# Patient Record
Sex: MALE | Race: BLACK OR AFRICAN AMERICAN | NOT HISPANIC OR LATINO | Employment: OTHER | URBAN - METROPOLITAN AREA
[De-identification: names, ages, dates, MRNs, and addresses within clinical notes are randomized per-mention and may not be internally consistent; named-entity substitution may affect disease eponyms.]

---

## 2023-03-29 DIAGNOSIS — E11.9 TYPE 2 DIABETES MELLITUS WITHOUT COMPLICATION, UNSPECIFIED WHETHER LONG TERM INSULIN USE (MULTI): Primary | ICD-10-CM

## 2023-03-29 DIAGNOSIS — I10 BENIGN ESSENTIAL HYPERTENSION: ICD-10-CM

## 2023-03-29 RX ORDER — AMLODIPINE BESYLATE 10 MG/1
10 TABLET ORAL DAILY
COMMUNITY
End: 2023-03-29 | Stop reason: SDUPTHER

## 2023-03-29 RX ORDER — METFORMIN HYDROCHLORIDE 500 MG/1
500 TABLET, EXTENDED RELEASE ORAL DAILY
COMMUNITY
Start: 2022-12-22 | End: 2023-03-29 | Stop reason: SDUPTHER

## 2023-03-30 RX ORDER — AMLODIPINE BESYLATE 10 MG/1
10 TABLET ORAL DAILY
Qty: 90 TABLET | Refills: 0 | Status: SHIPPED | OUTPATIENT
Start: 2023-03-30 | End: 2023-07-31 | Stop reason: SDUPTHER

## 2023-03-30 RX ORDER — METFORMIN HYDROCHLORIDE 500 MG/1
500 TABLET, EXTENDED RELEASE ORAL DAILY
Qty: 90 TABLET | Refills: 0 | Status: SHIPPED | OUTPATIENT
Start: 2023-03-30

## 2023-05-08 ENCOUNTER — OFFICE VISIT (OUTPATIENT)
Dept: PRIMARY CARE | Facility: CLINIC | Age: 73
End: 2023-05-08
Payer: MEDICARE

## 2023-05-08 ENCOUNTER — LAB (OUTPATIENT)
Dept: LAB | Facility: LAB | Age: 73
End: 2023-05-08
Payer: MEDICARE

## 2023-05-08 VITALS — SYSTOLIC BLOOD PRESSURE: 123 MMHG | DIASTOLIC BLOOD PRESSURE: 73 MMHG | WEIGHT: 218 LBS

## 2023-05-08 DIAGNOSIS — E11.9 TYPE 2 DIABETES MELLITUS WITHOUT COMPLICATION, UNSPECIFIED WHETHER LONG TERM INSULIN USE (MULTI): Primary | ICD-10-CM

## 2023-05-08 DIAGNOSIS — I10 PRIMARY HYPERTENSION: ICD-10-CM

## 2023-05-08 DIAGNOSIS — E11.9 TYPE 2 DIABETES MELLITUS WITHOUT COMPLICATION, UNSPECIFIED WHETHER LONG TERM INSULIN USE (MULTI): ICD-10-CM

## 2023-05-08 LAB
ESTIMATED AVERAGE GLUCOSE FOR HBA1C: 186 MG/DL
HEMOGLOBIN A1C/HEMOGLOBIN TOTAL IN BLOOD: 8.1 %

## 2023-05-08 PROCEDURE — 3078F DIAST BP <80 MM HG: CPT | Performed by: INTERNAL MEDICINE

## 2023-05-08 PROCEDURE — 3052F HG A1C>EQUAL 8.0%<EQUAL 9.0%: CPT | Performed by: INTERNAL MEDICINE

## 2023-05-08 PROCEDURE — 3074F SYST BP LT 130 MM HG: CPT | Performed by: INTERNAL MEDICINE

## 2023-05-08 PROCEDURE — 36415 COLL VENOUS BLD VENIPUNCTURE: CPT

## 2023-05-08 PROCEDURE — 83036 HEMOGLOBIN GLYCOSYLATED A1C: CPT

## 2023-05-08 PROCEDURE — 99213 OFFICE O/P EST LOW 20 MIN: CPT | Performed by: INTERNAL MEDICINE

## 2023-05-08 RX ORDER — CHLORTHALIDONE 25 MG/1
25 TABLET ORAL
COMMUNITY
Start: 2015-05-29 | End: 2023-05-08 | Stop reason: SDUPTHER

## 2023-05-08 RX ORDER — CHLORTHALIDONE 25 MG/1
25 TABLET ORAL
Qty: 90 TABLET | Refills: 1 | Status: SHIPPED | OUTPATIENT
Start: 2023-05-08 | End: 2023-07-31 | Stop reason: SDUPTHER

## 2023-05-08 NOTE — PROGRESS NOTES
Subjective   Patient ID: David Ko is a 72 y.o. male who presents for No chief complaint on file..    HPI follow-up visit no chest pain no shortness of breath no polyuria polydipsia no low blood sugars Home blood sugars have been averaging around 120s to 130s he does lawn care for himself and neighbors to exercise during the summertime previous laboratory results reviewed    Review of Systems    Objective   There were no vitals taken for this visit.  Vital signs noted alert and oriented x3 NCAT no JVD or bruit chest clear to auscultation CV regular rate and rhythm S1-S2 extremities no clubbing cyanosis or edema normal distal pulses  Physical Exam    Assessment/Plan    impression diabetes mellitus type 2 hypertension  Plan refill chlorthalidone see EMR check A1c advised on long-term blood sugar test diet weight loss exercise good water consumption review prior laboratory results and follow-up in 3 months    Review prior laboratory results (check)  does use [facility] med for strips

## 2023-07-21 ENCOUNTER — TELEPHONE (OUTPATIENT)
Dept: PRIMARY CARE | Facility: CLINIC | Age: 73
End: 2023-07-21
Payer: MEDICARE

## 2023-07-23 RX ORDER — INSULIN LISPRO 100 [IU]/ML
INJECTION, SOLUTION INTRAVENOUS; SUBCUTANEOUS
COMMUNITY
End: 2023-07-24

## 2023-07-23 RX ORDER — INSULIN LISPRO 100 [IU]/ML
4 INJECTION, SOLUTION INTRAVENOUS; SUBCUTANEOUS
COMMUNITY
Start: 2021-08-07 | End: 2023-07-24 | Stop reason: SDUPTHER

## 2023-07-24 DIAGNOSIS — E11.9 TYPE 2 DIABETES MELLITUS WITHOUT COMPLICATION, WITH LONG-TERM CURRENT USE OF INSULIN (MULTI): Primary | ICD-10-CM

## 2023-07-24 DIAGNOSIS — Z79.4 TYPE 2 DIABETES MELLITUS WITHOUT COMPLICATION, WITH LONG-TERM CURRENT USE OF INSULIN (MULTI): Primary | ICD-10-CM

## 2023-07-24 RX ORDER — INSULIN LISPRO 100 [IU]/ML
4 INJECTION, SOLUTION INTRAVENOUS; SUBCUTANEOUS
Qty: 3 ML | Refills: 11 | Status: CANCELLED | OUTPATIENT
Start: 2023-07-24 | End: 2024-05-19

## 2023-07-24 RX ORDER — INSULIN LISPRO 100 [IU]/ML
4 INJECTION, SOLUTION INTRAVENOUS; SUBCUTANEOUS
Qty: 10 ML | Refills: 1 | Status: SHIPPED | OUTPATIENT
Start: 2023-07-24 | End: 2023-08-20 | Stop reason: SDUPTHER

## 2023-07-31 DIAGNOSIS — I10 BENIGN ESSENTIAL HYPERTENSION: ICD-10-CM

## 2023-07-31 DIAGNOSIS — I10 PRIMARY HYPERTENSION: ICD-10-CM

## 2023-07-31 RX ORDER — HYDROCHLOROTHIAZIDE 25 MG/1
25 TABLET ORAL DAILY
COMMUNITY
End: 2023-07-31 | Stop reason: SDUPTHER

## 2023-08-01 RX ORDER — AMLODIPINE BESYLATE 10 MG/1
10 TABLET ORAL DAILY
Qty: 90 TABLET | Refills: 0 | Status: SHIPPED | OUTPATIENT
Start: 2023-08-01 | End: 2024-01-29 | Stop reason: SDUPTHER

## 2023-08-01 RX ORDER — HYDROCHLOROTHIAZIDE 25 MG/1
25 TABLET ORAL DAILY
Qty: 90 TABLET | Refills: 3 | Status: SHIPPED | OUTPATIENT
Start: 2023-08-01

## 2023-08-01 RX ORDER — CHLORTHALIDONE 25 MG/1
25 TABLET ORAL
Qty: 90 TABLET | Refills: 1 | Status: SHIPPED | OUTPATIENT
Start: 2023-08-01 | End: 2024-04-23

## 2023-08-15 ENCOUNTER — LAB (OUTPATIENT)
Dept: LAB | Facility: LAB | Age: 73
End: 2023-08-15
Payer: MEDICARE

## 2023-08-15 ENCOUNTER — OFFICE VISIT (OUTPATIENT)
Dept: PRIMARY CARE | Facility: CLINIC | Age: 73
End: 2023-08-15
Payer: MEDICARE

## 2023-08-15 VITALS — DIASTOLIC BLOOD PRESSURE: 76 MMHG | SYSTOLIC BLOOD PRESSURE: 137 MMHG | WEIGHT: 215 LBS

## 2023-08-15 DIAGNOSIS — E11.9 TYPE 2 DIABETES MELLITUS WITHOUT COMPLICATION, WITH LONG-TERM CURRENT USE OF INSULIN (MULTI): ICD-10-CM

## 2023-08-15 DIAGNOSIS — Z79.4 TYPE 2 DIABETES MELLITUS WITHOUT COMPLICATION, WITH LONG-TERM CURRENT USE OF INSULIN (MULTI): ICD-10-CM

## 2023-08-15 DIAGNOSIS — E78.2 MIXED HYPERLIPIDEMIA: ICD-10-CM

## 2023-08-15 DIAGNOSIS — E11.9 TYPE 2 DIABETES MELLITUS WITHOUT COMPLICATION, UNSPECIFIED WHETHER LONG TERM INSULIN USE (MULTI): Primary | ICD-10-CM

## 2023-08-15 DIAGNOSIS — E11.9 TYPE 2 DIABETES MELLITUS WITHOUT COMPLICATION, UNSPECIFIED WHETHER LONG TERM INSULIN USE (MULTI): ICD-10-CM

## 2023-08-15 DIAGNOSIS — I10 PRIMARY HYPERTENSION: ICD-10-CM

## 2023-08-15 DIAGNOSIS — N28.9 RENAL IMPAIRMENT: ICD-10-CM

## 2023-08-15 LAB
ESTIMATED AVERAGE GLUCOSE FOR HBA1C: 171 MG/DL
HEMOGLOBIN A1C/HEMOGLOBIN TOTAL IN BLOOD: 7.6 %

## 2023-08-15 PROCEDURE — 3075F SYST BP GE 130 - 139MM HG: CPT | Performed by: INTERNAL MEDICINE

## 2023-08-15 PROCEDURE — 83036 HEMOGLOBIN GLYCOSYLATED A1C: CPT

## 2023-08-15 PROCEDURE — 36415 COLL VENOUS BLD VENIPUNCTURE: CPT

## 2023-08-15 PROCEDURE — 99214 OFFICE O/P EST MOD 30 MIN: CPT | Performed by: INTERNAL MEDICINE

## 2023-08-15 PROCEDURE — 3051F HG A1C>EQUAL 7.0%<8.0%: CPT | Performed by: INTERNAL MEDICINE

## 2023-08-15 PROCEDURE — 3078F DIAST BP <80 MM HG: CPT | Performed by: INTERNAL MEDICINE

## 2023-08-15 NOTE — PROGRESS NOTES
Subjective   Patient ID: David Ko is a 72 y.o. male who presents for No chief complaint on file..    HPI follow-up visit no chest pain no shortness of breath no polyuria polydipsia nocturia x1 no low blood sugars but sometimes uses one half of his Humalog insulin 4 units instead of 8 units after meals Home blood sugars have been averaging around 150s has seen the renal physician that blood work was also reviewed slight anemia creatinine around 2.2 Review of Systems    Objective   There were no vitals taken for this visit.  Vital signs noted alert and oriented x3 NCAT no JVD or bruit chest clear to auscultation and percussion CV regular rate and rhythm S1-S2 without murmur gallop or rub extremities no clubbing cyanosis or edema normal distal pulses there is venous stasis and he is wearing stocking hose  Physical Exam    Assessment/Plan    impression diabetes mellitus type 2 hypertension hyperlipidemia?  Venous stasis renal insufficiency  Plan refill short acting insulin now Humalog see EMR check A1c advised on long-term blood sugar test diet weight loss exercise good water consumption he drinks approximately 64 ounces per day continue with blood pressure medication advised on kidneys and follow-up with nephrology advised on prior lipids which were low less than 140 total cholesterol and medication continue with stocking hose elevate legs watch salt in diet goal would be to try to get off of the KwikPen and use just the Lantus insulin review prior laboratory results with him follow-up in 3 months  tt40 cc21    Review prior laboratory results (check)  Refill Humalog KwikPen (check) and One Touch ultra test strips to [facility]

## 2023-08-20 RX ORDER — INSULIN LISPRO 100 [IU]/ML
4-8 INJECTION, SOLUTION INTRAVENOUS; SUBCUTANEOUS
Qty: 10 ML | Refills: 1 | Status: SHIPPED | OUTPATIENT
Start: 2023-08-20 | End: 2023-12-02

## 2023-10-25 PROBLEM — H26.9 CATARACT OF BOTH EYES: Status: ACTIVE | Noted: 2021-08-07

## 2023-10-25 PROBLEM — N18.4 CKD (CHRONIC KIDNEY DISEASE) STAGE 4, GFR 15-29 ML/MIN (MULTI): Chronic | Status: ACTIVE | Noted: 2021-08-04

## 2023-10-25 PROBLEM — H40.52X2: Status: ACTIVE | Noted: 2023-10-25

## 2023-10-25 PROBLEM — E11.311 DIABETIC MACULAR EDEMA (MULTI): Status: ACTIVE | Noted: 2021-08-07

## 2023-10-25 PROBLEM — H11.30 SCH (SUBCONJUNCTIVAL HEMORRHAGE): Status: ACTIVE | Noted: 2023-10-25

## 2023-10-25 PROBLEM — E11.319 DIABETIC RETINOPATHY (MULTI): Status: ACTIVE | Noted: 2023-10-25

## 2023-10-25 PROBLEM — H40.1190 PRIMARY OPEN ANGLE GLAUCOMA (POAG): Status: ACTIVE | Noted: 2021-08-07

## 2023-10-25 PROBLEM — H40.89: Status: ACTIVE | Noted: 2021-08-07

## 2023-10-25 PROBLEM — E78.2 COMBINED HYPERLIPIDEMIA: Status: ACTIVE | Noted: 2023-10-25

## 2023-10-25 PROBLEM — H52.4 BILATERAL PRESBYOPIA: Status: ACTIVE | Noted: 2023-10-25

## 2023-10-25 PROBLEM — N18.9 CHRONIC RENAL INSUFFICIENCY: Status: ACTIVE | Noted: 2023-10-25

## 2023-10-25 PROBLEM — H52.203 ASTIGMATISM, BILATERAL: Status: ACTIVE | Noted: 2023-10-25

## 2023-10-25 PROBLEM — H52.00 HYPEROPIA: Status: ACTIVE | Noted: 2021-08-07

## 2023-10-25 RX ORDER — BLOOD SUGAR DIAGNOSTIC
STRIP MISCELLANEOUS
COMMUNITY
Start: 2022-03-29 | End: 2023-11-15 | Stop reason: SDUPTHER

## 2023-10-25 RX ORDER — LANCETS 33 GAUGE
EACH MISCELLANEOUS
COMMUNITY
Start: 2023-05-22

## 2023-10-25 RX ORDER — PEN NEEDLE, DIABETIC 31 GX5/16"
NEEDLE, DISPOSABLE MISCELLANEOUS
COMMUNITY
Start: 2023-06-29 | End: 2023-11-15 | Stop reason: SDUPTHER

## 2023-10-25 RX ORDER — ROSUVASTATIN CALCIUM 10 MG/1
10 TABLET, COATED ORAL NIGHTLY
COMMUNITY
Start: 2022-11-23

## 2023-10-25 RX ORDER — HYDRALAZINE HYDROCHLORIDE 25 MG/1
25 TABLET, FILM COATED ORAL DAILY
COMMUNITY
Start: 2018-02-19

## 2023-10-25 RX ORDER — BRIMONIDINE TARTRATE AND TIMOLOL MALEATE 2; 5 MG/ML; MG/ML
SOLUTION OPHTHALMIC
COMMUNITY
Start: 2015-04-22 | End: 2023-11-27 | Stop reason: SDUPTHER

## 2023-10-25 RX ORDER — INSULIN DETEMIR 100 [IU]/ML
INJECTION, SOLUTION SUBCUTANEOUS
COMMUNITY
Start: 2015-03-03

## 2023-10-25 RX ORDER — CLOTRIMAZOLE 1 %
CREAM (GRAM) TOPICAL
COMMUNITY
Start: 2015-07-20

## 2023-10-25 RX ORDER — METOPROLOL TARTRATE 25 MG/1
25 TABLET, FILM COATED ORAL DAILY
COMMUNITY

## 2023-10-25 RX ORDER — DORZOLAMIDE HCL 20 MG/ML
1 SOLUTION/ DROPS OPHTHALMIC 2 TIMES DAILY
COMMUNITY
Start: 2017-12-20

## 2023-10-25 RX ORDER — TRAVOPROST OPHTHALMIC SOLUTION 0.04 MG/ML
SOLUTION OPHTHALMIC
COMMUNITY
End: 2023-11-27 | Stop reason: SDUPTHER

## 2023-10-25 RX ORDER — LISINOPRIL 30 MG/1
TABLET ORAL
COMMUNITY
End: 2024-02-29 | Stop reason: SDUPTHER

## 2023-10-25 RX ORDER — ASPIRIN 81 MG/1
TABLET ORAL
COMMUNITY

## 2023-10-25 RX ORDER — ERGOCALCIFEROL 1.25 MG/1
50000 CAPSULE ORAL WEEKLY
COMMUNITY
Start: 2023-02-08 | End: 2023-11-15 | Stop reason: SDUPTHER

## 2023-10-25 RX ORDER — AMLODIPINE BESYLATE 5 MG/1
5 TABLET ORAL
COMMUNITY
Start: 2018-02-19 | End: 2023-10-26 | Stop reason: WASHOUT

## 2023-10-25 RX ORDER — UBIQUINOL 100 MG
CAPSULE ORAL
COMMUNITY
Start: 2023-06-29 | End: 2023-10-26 | Stop reason: WASHOUT

## 2023-10-25 RX ORDER — INSULIN GLARGINE 100 [IU]/ML
INJECTION, SOLUTION SUBCUTANEOUS
COMMUNITY
Start: 2016-01-15 | End: 2024-04-08 | Stop reason: SDUPTHER

## 2023-10-25 RX ORDER — BRIMONIDINE TARTRATE 2 MG/ML
SOLUTION/ DROPS OPHTHALMIC
COMMUNITY
Start: 2022-11-11 | End: 2023-11-01

## 2023-10-25 RX ORDER — MEDICAL SUPPLY, MISCELLANEOUS
EACH MISCELLANEOUS
COMMUNITY
Start: 2023-05-22

## 2023-10-25 RX ORDER — METOPROLOL TARTRATE 50 MG/1
50 TABLET ORAL 2 TIMES DAILY
COMMUNITY
End: 2023-10-26 | Stop reason: WASHOUT

## 2023-10-25 RX ORDER — PRAVASTATIN SODIUM 10 MG/1
TABLET ORAL
COMMUNITY
Start: 2019-06-21

## 2023-10-25 RX ORDER — INSULIN GLARGINE 100 [IU]/ML
INJECTION, SOLUTION SUBCUTANEOUS
COMMUNITY
Start: 2021-08-07

## 2023-10-25 RX ORDER — LATANOPROST 50 UG/ML
SOLUTION/ DROPS OPHTHALMIC
COMMUNITY
Start: 2018-01-18 | End: 2023-11-27 | Stop reason: SDUPTHER

## 2023-10-25 RX ORDER — CALCIUM CARB/VITAMIN D3/VIT K1 500-100-40
TABLET,CHEWABLE ORAL
COMMUNITY
Start: 2015-06-29

## 2023-10-26 ENCOUNTER — OFFICE VISIT (OUTPATIENT)
Dept: OPHTHALMOLOGY | Facility: CLINIC | Age: 73
End: 2023-10-26
Payer: MEDICARE

## 2023-10-26 DIAGNOSIS — E11.3512 PROLIFERATIVE DIABETIC RETINOPATHY OF LEFT EYE WITH MACULAR EDEMA ASSOCIATED WITH TYPE 2 DIABETES MELLITUS (MULTI): ICD-10-CM

## 2023-10-26 DIAGNOSIS — E11.319 DIABETIC RETINOPATHY WITHOUT MACULAR EDEMA ASSOCIATED WITH TYPE 2 DIABETES MELLITUS, UNSPECIFIED LATERALITY, UNSPECIFIED RETINOPATHY SEVERITY (MULTI): Primary | ICD-10-CM

## 2023-10-26 DIAGNOSIS — C69.31: ICD-10-CM

## 2023-10-26 PROCEDURE — 99214 OFFICE O/P EST MOD 30 MIN: CPT | Performed by: OPHTHALMOLOGY

## 2023-10-26 PROCEDURE — 92134 CPTRZ OPH DX IMG PST SGM RTA: CPT | Mod: BILATERAL PROCEDURE | Performed by: OPHTHALMOLOGY

## 2023-10-26 ASSESSMENT — CONF VISUAL FIELD
OS_SUPERIOR_NASAL_RESTRICTION: 0
OD_INFERIOR_TEMPORAL_RESTRICTION: 0
OS_INFERIOR_NASAL_RESTRICTION: 0
OD_NORMAL: 1
OD_SUPERIOR_TEMPORAL_RESTRICTION: 0
OS_INFERIOR_TEMPORAL_RESTRICTION: 0
OS_SUPERIOR_TEMPORAL_RESTRICTION: 0
OS_NORMAL: 1
OD_INFERIOR_NASAL_RESTRICTION: 0
OD_SUPERIOR_NASAL_RESTRICTION: 0

## 2023-10-26 ASSESSMENT — TONOMETRY
OD_IOP_MMHG: 20
OS_IOP_MMHG: 17
IOP_METHOD: TONOPEN

## 2023-10-26 ASSESSMENT — ENCOUNTER SYMPTOMS
CARDIOVASCULAR NEGATIVE: 0
ALLERGIC/IMMUNOLOGIC NEGATIVE: 0
PSYCHIATRIC NEGATIVE: 0
HEMATOLOGIC/LYMPHATIC NEGATIVE: 0
MUSCULOSKELETAL NEGATIVE: 0
NEUROLOGICAL NEGATIVE: 0
RESPIRATORY NEGATIVE: 0
CONSTITUTIONAL NEGATIVE: 0
GASTROINTESTINAL NEGATIVE: 0
EYES NEGATIVE: 0
ENDOCRINE NEGATIVE: 0

## 2023-10-26 ASSESSMENT — VISUAL ACUITY
OS_SC: 20/25
METHOD: SNELLEN - LINEAR
OD_SC: BLIND
OS_SC+: +1

## 2023-10-26 NOTE — PROGRESS NOTES
Impression    1 E11.3512 Controlled diabetes mellitus with left eye affected by proliferative retinopathy and macular edema-Improving  2 H40.1122 Primary open angle glaucoma of left eye, moderate stage-Stable  3 H40.51X3 Neovascular glaucoma of right eye, severe stage-Stable  4 H26.9 Bilateral cataracts-Stable  5 H43.12 Vitreous hemorrhage of left eye-Stable  6 H52.203 Astigmatism, bilateral-Stable  7 H52.4 Bilateral presbyopia-Stable          Discussion    1. PDR OU. NVG OD vit hem os got treated with Eylea OS. today clear vitreous   f/u 3m. has PDR DME OS  E11.3511   sugery OS avoided now focus on DME OS DME vastly improved        The right eye was being treated to keep eye from being too painful but today no pain    no treatment today OD  needs treatment for left eye    however left eye he has DME   NPDR DME mild E11.3212      Treatment options for DME OS discussed  Treatment options for DME OS discussed, including observation, anti-VEGF injections (including Avastin, Lucentis, Vabysmo and Eylea) and laser. Recommend anti-VEGF injections. Eylea done OS today in a sterile manner with Betadine 5% for antisepsis.        2. 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  There is flow thru the BGI shunt OD; right eye is NLP and comfortable.  IOP is borderline controlled OS, but acceptable given rel healthy ON.  Pt is monocular. Latanoprost --> no effect         cont combigan OU BID            cont travatan OS QHS            f/u 3 months              3.  Cataracts OD>OS:  not significant OD and mild-moderately visually significant OS.             Plan

## 2023-10-31 DIAGNOSIS — H40.89 OTHER SPECIFIED GLAUCOMA: ICD-10-CM

## 2023-11-01 RX ORDER — BRIMONIDINE TARTRATE 2 MG/ML
1 SOLUTION/ DROPS OPHTHALMIC 2 TIMES DAILY
Qty: 5 ML | Refills: 3 | Status: SHIPPED | OUTPATIENT
Start: 2023-11-01 | End: 2023-11-27 | Stop reason: SDUPTHER

## 2023-11-15 ENCOUNTER — OFFICE VISIT (OUTPATIENT)
Dept: PRIMARY CARE | Facility: CLINIC | Age: 73
End: 2023-11-15
Payer: MEDICARE

## 2023-11-15 VITALS — DIASTOLIC BLOOD PRESSURE: 80 MMHG | SYSTOLIC BLOOD PRESSURE: 150 MMHG

## 2023-11-15 DIAGNOSIS — N28.9 RENAL IMPAIRMENT: ICD-10-CM

## 2023-11-15 DIAGNOSIS — Z00.00 HEALTH CARE MAINTENANCE: Primary | ICD-10-CM

## 2023-11-15 DIAGNOSIS — I10 PRIMARY HYPERTENSION: ICD-10-CM

## 2023-11-15 DIAGNOSIS — E11.9 TYPE 2 DIABETES MELLITUS WITHOUT COMPLICATION, UNSPECIFIED WHETHER LONG TERM INSULIN USE (MULTI): Primary | ICD-10-CM

## 2023-11-15 PROCEDURE — 99213 OFFICE O/P EST LOW 20 MIN: CPT | Performed by: INTERNAL MEDICINE

## 2023-11-15 PROCEDURE — 3077F SYST BP >= 140 MM HG: CPT | Performed by: INTERNAL MEDICINE

## 2023-11-15 PROCEDURE — 3079F DIAST BP 80-89 MM HG: CPT | Performed by: INTERNAL MEDICINE

## 2023-11-15 PROCEDURE — 3051F HG A1C>EQUAL 7.0%<8.0%: CPT | Performed by: INTERNAL MEDICINE

## 2023-11-15 PROCEDURE — 4010F ACE/ARB THERAPY RXD/TAKEN: CPT | Performed by: INTERNAL MEDICINE

## 2023-11-15 NOTE — PROGRESS NOTES
Subjective   Patient ID: David Ko is a 73 y.o. male who presents for No chief complaint on file..    HPI follow-up visit no chest pain no shortness of breath had some recent blood work discussed with him kidney function and blood sugars as well as blood pressure no recent changes were made no polyuria polydipsia nocturia x1 no hypoglycemic events    Review of Systems    Objective   There were no vitals taken for this visit.    Physical Exam vital signs noted alert and oriented x3 NCAT no JVD or bruit chest clear to auscultation and percussion CV regular rate and rhythm S1-S2 without murmur gallop or rub abdomen soft nontender normal active bowel sounds extremities no clubbing cyanosis there is nonpitting edema right greater than left intact distal pulses    Assessment/Plan    impression diabetes mellitus hypertension other diagnoses renal  Plan good diet regular exercise increase water consumption elevate legs at the end of the day stocking hose if on feet when he cleans the [employer] once per week and at other times continue with medications check on blood sugars at home reviewed prior glycohemoglobin creatinine and blood pressures recheck in February 2024 with blood work

## 2023-11-16 RX ORDER — BLOOD SUGAR DIAGNOSTIC
STRIP MISCELLANEOUS
Qty: 100 EACH | Refills: 0 | Status: SHIPPED | OUTPATIENT
Start: 2023-11-16 | End: 2023-12-28 | Stop reason: SDUPTHER

## 2023-11-16 RX ORDER — PEN NEEDLE, DIABETIC 31 GX5/16"
NEEDLE, DISPOSABLE MISCELLANEOUS
Qty: 100 EACH | Refills: 0 | Status: SHIPPED | OUTPATIENT
Start: 2023-11-16 | End: 2024-03-01 | Stop reason: SDUPTHER

## 2023-11-16 RX ORDER — ERGOCALCIFEROL 1.25 MG/1
50000 CAPSULE ORAL
Qty: 12 CAPSULE | Refills: 0 | Status: SHIPPED | OUTPATIENT
Start: 2023-11-16

## 2023-11-27 DIAGNOSIS — H40.89 OTHER SPECIFIED GLAUCOMA: ICD-10-CM

## 2023-11-27 RX ORDER — BRIMONIDINE TARTRATE AND TIMOLOL MALEATE 2; 5 MG/ML; MG/ML
SOLUTION OPHTHALMIC
Qty: 10 ML | Refills: 3 | Status: SHIPPED | OUTPATIENT
Start: 2023-11-27 | End: 2023-12-28

## 2023-11-27 RX ORDER — LATANOPROST 50 UG/ML
SOLUTION/ DROPS OPHTHALMIC
Qty: 2.5 ML | Refills: 3 | Status: SHIPPED | OUTPATIENT
Start: 2023-11-27

## 2023-11-27 RX ORDER — BRIMONIDINE TARTRATE 2 MG/ML
1 SOLUTION/ DROPS OPHTHALMIC 2 TIMES DAILY
Qty: 5 ML | Refills: 3 | Status: SHIPPED | OUTPATIENT
Start: 2023-11-27 | End: 2023-12-28 | Stop reason: SDUPTHER

## 2023-11-27 RX ORDER — TRAVOPROST OPHTHALMIC SOLUTION 0.04 MG/ML
SOLUTION OPHTHALMIC
Qty: 10 ML | Refills: 3 | Status: SHIPPED | OUTPATIENT
Start: 2023-11-27 | End: 2024-01-29

## 2023-12-01 DIAGNOSIS — E11.9 TYPE 2 DIABETES MELLITUS WITHOUT COMPLICATION, UNSPECIFIED WHETHER LONG TERM INSULIN USE (MULTI): ICD-10-CM

## 2023-12-02 RX ORDER — INSULIN LISPRO 100 [IU]/ML
INJECTION, SOLUTION INTRAVENOUS; SUBCUTANEOUS
Qty: 15 ML | Refills: 4 | Status: SHIPPED | OUTPATIENT
Start: 2023-12-02

## 2023-12-28 ENCOUNTER — FOLLOW-UP (OUTPATIENT)
Dept: OPHTHALMOLOGY | Facility: CLINIC | Age: 73
End: 2023-12-28
Payer: MEDICARE

## 2023-12-28 DIAGNOSIS — Z00.00 HEALTH CARE MAINTENANCE: ICD-10-CM

## 2023-12-28 DIAGNOSIS — H40.89 OTHER SPECIFIED GLAUCOMA: ICD-10-CM

## 2023-12-28 DIAGNOSIS — H40.1121 PRIMARY OPEN-ANGLE GLAUCOMA, LEFT EYE, MILD STAGE: ICD-10-CM

## 2023-12-28 DIAGNOSIS — H40.51X3 NEOVASCULAR GLAUCOMA, RIGHT EYE, SEVERE STAGE: Primary | ICD-10-CM

## 2023-12-28 DIAGNOSIS — E11.3212 MILD NONPROLIFERATIVE DIABETIC RETINOPATHY OF LEFT EYE WITH MACULAR EDEMA ASSOCIATED WITH TYPE 2 DIABETES MELLITUS (MULTI): Primary | ICD-10-CM

## 2023-12-28 PROCEDURE — 67028 INJECTION EYE DRUG: CPT | Mod: LEFT SIDE | Performed by: OPHTHALMOLOGY

## 2023-12-28 PROCEDURE — 92134 CPTRZ OPH DX IMG PST SGM RTA: CPT | Mod: BILATERAL PROCEDURE | Performed by: OPHTHALMOLOGY

## 2023-12-28 RX ORDER — CALCIUM CARB/VITAMIN D3/VIT K1 500-100-40
TABLET,CHEWABLE ORAL
Qty: 100 EACH | OUTPATIENT
Start: 2023-12-28

## 2023-12-28 RX ORDER — BLOOD SUGAR DIAGNOSTIC
STRIP MISCELLANEOUS
Qty: 100 EACH | Refills: 0 | OUTPATIENT
Start: 2023-12-28

## 2023-12-28 RX ORDER — TIMOLOL MALEATE 5 MG/ML
1 SOLUTION/ DROPS OPHTHALMIC DAILY
Qty: 30 ML | Refills: 3 | Status: SHIPPED | OUTPATIENT
Start: 2023-12-28 | End: 2024-12-27

## 2023-12-28 RX ORDER — BLOOD SUGAR DIAGNOSTIC
STRIP MISCELLANEOUS
Qty: 100 EACH | Refills: 0 | Status: SHIPPED | OUTPATIENT
Start: 2023-12-28 | End: 2024-01-17 | Stop reason: SDUPTHER

## 2023-12-28 RX ORDER — PEN NEEDLE, DIABETIC 31 GX5/16"
NEEDLE, DISPOSABLE MISCELLANEOUS
Qty: 100 EACH | Refills: 0 | OUTPATIENT
Start: 2023-12-28

## 2023-12-28 RX ORDER — BRIMONIDINE TARTRATE 2 MG/ML
1 SOLUTION/ DROPS OPHTHALMIC 2 TIMES DAILY
Qty: 30 ML | Refills: 3 | Status: SHIPPED | OUTPATIENT
Start: 2023-12-28

## 2023-12-28 ASSESSMENT — PACHYMETRY
OD_CT(UM): 584
OS_CT(UM): 589

## 2023-12-28 ASSESSMENT — EXTERNAL EXAM - RIGHT EYE: OD_EXAM: NORMAL

## 2023-12-28 ASSESSMENT — TONOMETRY
OS_IOP_MMHG: 17
IOP_METHOD: GOLDMANN APPLANATION
OD_IOP_MMHG: 40

## 2023-12-28 ASSESSMENT — VISUAL ACUITY
OS_PH_CC: 20/20
OD_CC: NLP
CORRECTION_TYPE: GLASSES
METHOD: SNELLEN - LINEAR
OS_CC: 20/40-2

## 2023-12-28 ASSESSMENT — CONF VISUAL FIELD
OS_INFERIOR_NASAL_RESTRICTION: 0
OS_SUPERIOR_TEMPORAL_RESTRICTION: 0
OS_NORMAL: 1
OS_SUPERIOR_NASAL_RESTRICTION: 0
OS_INFERIOR_TEMPORAL_RESTRICTION: 0

## 2023-12-28 ASSESSMENT — EXTERNAL EXAM - LEFT EYE: OS_EXAM: NORMAL

## 2023-12-28 ASSESSMENT — SLIT LAMP EXAM - LIDS
COMMENTS: NORMAL
COMMENTS: NORMAL

## 2023-12-28 ASSESSMENT — CUP TO DISC RATIO: OS_RATIO: 0.7

## 2023-12-28 NOTE — PROGRESS NOTES
Impression    1 E11.3512 Controlled diabetes mellitus with left eye affected by proliferative retinopathy and macular edema-Improving  2 H40.1122 Primary open angle glaucoma of left eye, moderate stage-Stable  3 H40.51X3 Neovascular glaucoma of right eye, severe stage-Stable  4 H26.9 Bilateral cataracts-Stable  5 H43.12 Vitreous hemorrhage of left eye-Stable  6 H52.203 Astigmatism, bilateral-Stable  7 H52.4 Bilateral presbyopia-Stable          Discussion    1. PDR OU. NVG OD vit hem os got treated with Eylea OS. today clear vitreous   f/u 3m. has PDR DME OS  E11.3511   sugery OS avoided now focus on DME OS DME vastly improved        The right eye was being treated to keep eye from being too painful but today no pain    no treatment today OD  needs treatment for left eye    however left eye he has DME  and vision has declined from 20/25 to 20/40  Exam and OCT reveal signifant edema today    NPDR DME mild E11.3212    Patient is approved for Vabysmo    Treatment options for DME OS discussed  Treatment options for DME OS discussed, including observation, anti-VEGF injections (including Avastin, Lucentis, Vabysmo and Eylea) and laser. Recommend anti-VEGF injections. Vabysmo done OS today in a sterile manner with Betadine 5% for antisepsis.        2. 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  There is flow thru the BGI shunt OD; right eye is NLP and comfortable.  IOP is borderline controlled OS, but acceptable given rel healthy ON.  Pt is monocular. Latanoprost --> no effect       Follows with Dr. Servando guillermo OU BID            cont travatan OS QHS            f/u 3 months              3.  Cataracts OD>OS:  not significant OD and mild-moderately visually significant OS.             Plan    RTC 4 weeks

## 2024-01-02 DIAGNOSIS — Z00.00 HEALTH CARE MAINTENANCE: ICD-10-CM

## 2024-01-02 RX ORDER — BLOOD SUGAR DIAGNOSTIC
STRIP MISCELLANEOUS
Qty: 100 EACH | Refills: 0 | OUTPATIENT
Start: 2024-01-02

## 2024-01-10 ENCOUNTER — OFFICE VISIT (OUTPATIENT)
Dept: OPHTHALMOLOGY | Facility: CLINIC | Age: 74
End: 2024-01-10
Payer: MEDICARE

## 2024-01-10 DIAGNOSIS — H40.51X3 NEOVASCULAR GLAUCOMA, RIGHT EYE, SEVERE STAGE: ICD-10-CM

## 2024-01-10 DIAGNOSIS — H40.1121 PRIMARY OPEN-ANGLE GLAUCOMA, LEFT EYE, MILD STAGE: Primary | ICD-10-CM

## 2024-01-10 PROCEDURE — 99213 OFFICE O/P EST LOW 20 MIN: CPT | Performed by: OPHTHALMOLOGY

## 2024-01-10 RX ORDER — BRIMONIDINE TARTRATE AND TIMOLOL MALEATE 2; 5 MG/ML; MG/ML
SOLUTION OPHTHALMIC
COMMUNITY
Start: 2024-01-05

## 2024-01-10 ASSESSMENT — PACHYMETRY
OS_CT(UM): 589
OD_CT(UM): 584

## 2024-01-10 ASSESSMENT — REFRACTION_WEARINGRX
OS_ADD: +2.75
OS_AXIS: 095
OS_CYLINDER: -0.75
OS_SPHERE: +1.25

## 2024-01-10 ASSESSMENT — TONOMETRY
OD_IOP_MMHG: 45
OS_IOP_MMHG: 15
IOP_METHOD: GOLDMANN APPLANATION

## 2024-01-10 ASSESSMENT — EXTERNAL EXAM - LEFT EYE: OS_EXAM: NORMAL

## 2024-01-10 ASSESSMENT — ENCOUNTER SYMPTOMS
EYES NEGATIVE: 0
NEUROLOGICAL NEGATIVE: 0
ENDOCRINE NEGATIVE: 0
PSYCHIATRIC NEGATIVE: 0
CONSTITUTIONAL NEGATIVE: 0
MUSCULOSKELETAL NEGATIVE: 0
RESPIRATORY NEGATIVE: 0
CARDIOVASCULAR NEGATIVE: 0
ALLERGIC/IMMUNOLOGIC NEGATIVE: 0
HEMATOLOGIC/LYMPHATIC NEGATIVE: 0
GASTROINTESTINAL NEGATIVE: 0

## 2024-01-10 ASSESSMENT — CONF VISUAL FIELD
OD_INFERIOR_NASAL_RESTRICTION: 1
OS_INFERIOR_NASAL_RESTRICTION: 0
OS_INFERIOR_TEMPORAL_RESTRICTION: 0
OD_SUPERIOR_TEMPORAL_RESTRICTION: 1
OS_NORMAL: 1
OD_INFERIOR_TEMPORAL_RESTRICTION: 1
OS_SUPERIOR_TEMPORAL_RESTRICTION: 0
OD_SUPERIOR_NASAL_RESTRICTION: 1
OS_SUPERIOR_NASAL_RESTRICTION: 0

## 2024-01-10 ASSESSMENT — VISUAL ACUITY
OS_CC+: +2
OD_CC: NLP
CORRECTION_TYPE: GLASSES
METHOD: SNELLEN - LINEAR
OS_CC: 20/25

## 2024-01-10 ASSESSMENT — SLIT LAMP EXAM - LIDS
COMMENTS: NORMAL
COMMENTS: NORMAL

## 2024-01-10 ASSESSMENT — EXTERNAL EXAM - RIGHT EYE: OD_EXAM: NORMAL

## 2024-01-10 ASSESSMENT — CUP TO DISC RATIO: OS_RATIO: 0.7

## 2024-01-10 NOTE — PROGRESS NOTES
Visual Acuity (Snellen - Linear)         Right Left    Dist cc NLP 20/25 +2      Correction: Glasses          Tonometry       Tonometry (Goldmann Applanation, 10:20 AM)         Right Left    Pressure 45 15                  Assessment/Plan   Last dilation:  4/17/23    1. 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  There is flow thru the Ahmed shunt OD; right eye is NLP and comfortable.  IOP remains controlled OS and acceptable given relatively healthy ON.  Pt is monocular. Latanoprost   --> no effect. IOP acceptable today, comfortable OD.       Plan:  cont combigan OU BID               cont travatan OS QHS               cont dorzolamide OS BID               f/u 4 months (HVF, dilation, RNFL)             2.  Cataracts OD>OS:  not significant OD and mild-moderately visually significant OS.        Plan:  as per Chery Santana      3.  Proliferative diabetic retinopathy~E11.359, h/o Vitreous hemorrhage of left eye. s/p avastin injection with resolution of VH 8/17/17.      Plan:  as per Dr. Hodges

## 2024-01-17 DIAGNOSIS — Z00.00 HEALTH CARE MAINTENANCE: ICD-10-CM

## 2024-01-17 RX ORDER — BLOOD SUGAR DIAGNOSTIC
STRIP MISCELLANEOUS
Qty: 100 EACH | Refills: 0 | Status: SHIPPED | OUTPATIENT
Start: 2024-01-17 | End: 2024-01-19 | Stop reason: SDUPTHER

## 2024-01-19 DIAGNOSIS — Z00.00 HEALTH CARE MAINTENANCE: Primary | ICD-10-CM

## 2024-01-19 RX ORDER — BLOOD SUGAR DIAGNOSTIC
STRIP MISCELLANEOUS
Qty: 100 EACH | Refills: 0 | Status: SHIPPED | OUTPATIENT
Start: 2024-01-19 | End: 2024-01-22 | Stop reason: SDUPTHER

## 2024-01-23 RX ORDER — BLOOD SUGAR DIAGNOSTIC
STRIP MISCELLANEOUS
Qty: 100 EACH | Refills: 0 | Status: SHIPPED | OUTPATIENT
Start: 2024-01-23 | End: 2024-01-24 | Stop reason: SDUPTHER

## 2024-01-25 ENCOUNTER — OFFICE VISIT (OUTPATIENT)
Dept: OPHTHALMOLOGY | Facility: CLINIC | Age: 74
End: 2024-01-25
Payer: MEDICARE

## 2024-01-25 DIAGNOSIS — E11.3512: Primary | ICD-10-CM

## 2024-01-25 DIAGNOSIS — H40.52X2 NEOVASCULAR GLAUCOMA OF LEFT EYE, MODERATE STAGE: ICD-10-CM

## 2024-01-25 DIAGNOSIS — H40.1113 PRIMARY OPEN ANGLE GLAUCOMA (POAG) OF RIGHT EYE, SEVERE STAGE: ICD-10-CM

## 2024-01-25 DIAGNOSIS — E11.3512 PROLIFERATIVE DIABETIC RETINOPATHY OF LEFT EYE WITH MACULAR EDEMA ASSOCIATED WITH TYPE 2 DIABETES MELLITUS (MULTI): ICD-10-CM

## 2024-01-25 PROBLEM — H40.51X3 NEOVASCULAR GLAUCOMA OF RIGHT EYE, SEVERE STAGE: Status: ACTIVE | Noted: 2023-10-25

## 2024-01-25 PROCEDURE — 67028 INJECTION EYE DRUG: CPT | Mod: LEFT SIDE | Performed by: OPHTHALMOLOGY

## 2024-01-25 PROCEDURE — 92134 CPTRZ OPH DX IMG PST SGM RTA: CPT | Mod: BILATERAL PROCEDURE | Performed by: OPHTHALMOLOGY

## 2024-01-25 ASSESSMENT — CONF VISUAL FIELD
OS_INFERIOR_NASAL_RESTRICTION: 0
OD_SUPERIOR_TEMPORAL_RESTRICTION: 1
OD_INFERIOR_TEMPORAL_RESTRICTION: 1
OS_NORMAL: 1
OD_INFERIOR_NASAL_RESTRICTION: 1
OS_SUPERIOR_TEMPORAL_RESTRICTION: 0
OS_SUPERIOR_NASAL_RESTRICTION: 0
OS_INFERIOR_TEMPORAL_RESTRICTION: 0
OD_SUPERIOR_NASAL_RESTRICTION: 1

## 2024-01-25 ASSESSMENT — VISUAL ACUITY
METHOD: SNELLEN - LINEAR
OS_CC: 20/30-2
OD_CC: NLP
CORRECTION_TYPE: GLASSES

## 2024-01-25 ASSESSMENT — PACHYMETRY
OS_CT(UM): 589
OD_CT(UM): 584

## 2024-01-25 ASSESSMENT — SLIT LAMP EXAM - LIDS
COMMENTS: NORMAL
COMMENTS: NORMAL

## 2024-01-25 ASSESSMENT — CUP TO DISC RATIO: OS_RATIO: 0.7

## 2024-01-25 ASSESSMENT — TONOMETRY
OS_IOP_MMHG: 16
IOP_METHOD: GOLDMANN APPLANATION
OD_IOP_MMHG: 34

## 2024-01-25 ASSESSMENT — EXTERNAL EXAM - LEFT EYE: OS_EXAM: NORMAL

## 2024-01-25 ASSESSMENT — EXTERNAL EXAM - RIGHT EYE: OD_EXAM: NORMAL

## 2024-01-25 NOTE — PROGRESS NOTES
Impression    1 E11.3512 Controlled diabetes mellitus with left eye affected by proliferative retinopathy and macular edema-Improving  2 H40.1122 Primary open angle glaucoma of left eye, moderate stage-Stable  3 H40.51X3 Neovascular glaucoma of right eye, severe stage-Stable  4 H26.9 Bilateral cataracts-Stable  5 H43.12 Vitreous hemorrhage of left eye-Stable  6 H52.203 Astigmatism, bilateral-Stable  7 H52.4 Bilateral presbyopia-Stable          Discussion    1. PDR OU. NVG OD vit hem os got treated with Eylea OS. today clear vitreous   f/u 3m. has PDR DME OS  E11.3512   sugery OS avoided now focus on DME OS DME vastly improved        The right eye was being treated to keep eye from being too painful but today no pain    no treatment today OD  needs treatment for left eye    however left eye he has DME  and vision has declined from 20/25 to 20/40  Exam and OCT reveal signifant edema today    NPDR DME mild E11.3212    Patient is approved for Vabysmo    Treatment options for DME OS discussed  Treatment options for DME OS discussed, including observation, anti-VEGF injections (including Avastin, Lucentis, Vabysmo and Eylea) and laser. Recommend anti-VEGF injections. Vabysmo done OS today in a sterile manner with Betadine 5% for antisepsis.        2. 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  There is flow thru the BGI shunt OD; right eye is NLP and comfortable.  IOP is borderline controlled OS, but acceptable given rel healthy ON.  Pt is monocular. Latanoprost --> no effect       Follows with Dr. Servando guillermo OU BID            cont travatan OS QHS            f/u 3 months              3.  Cataracts OD>OS:  not significant OD and mild-moderately visually significant OS.             Plan    RTC 4 weeks

## 2024-01-29 DIAGNOSIS — H40.89 OTHER SPECIFIED GLAUCOMA: ICD-10-CM

## 2024-01-29 RX ORDER — TRAVOPROST OPHTHALMIC SOLUTION 0.04 MG/ML
SOLUTION OPHTHALMIC
Qty: 10 ML | Refills: 1 | Status: SHIPPED | OUTPATIENT
Start: 2024-01-29

## 2024-02-13 ENCOUNTER — OFFICE VISIT (OUTPATIENT)
Dept: OPHTHALMOLOGY | Facility: CLINIC | Age: 74
End: 2024-02-13
Payer: MEDICARE

## 2024-02-13 DIAGNOSIS — E11.3512 PROLIFERATIVE DIABETIC RETINOPATHY OF LEFT EYE WITH MACULAR EDEMA ASSOCIATED WITH TYPE 2 DIABETES MELLITUS (MULTI): Primary | ICD-10-CM

## 2024-02-13 DIAGNOSIS — H40.1113 PRIMARY OPEN ANGLE GLAUCOMA (POAG) OF RIGHT EYE, SEVERE STAGE: ICD-10-CM

## 2024-02-13 DIAGNOSIS — H40.51X3 NEOVASCULAR GLAUCOMA OF RIGHT EYE, SEVERE STAGE: ICD-10-CM

## 2024-02-13 PROCEDURE — 92134 CPTRZ OPH DX IMG PST SGM RTA: CPT | Mod: BILATERAL PROCEDURE | Performed by: OPHTHALMOLOGY

## 2024-02-13 PROCEDURE — 67028 INJECTION EYE DRUG: CPT | Mod: LEFT SIDE | Performed by: OPHTHALMOLOGY

## 2024-02-13 ASSESSMENT — VISUAL ACUITY
METHOD: SNELLEN - LINEAR
OD_CC: NLP
OS_CC: 20/

## 2024-02-13 ASSESSMENT — CONF VISUAL FIELD
OS_INFERIOR_NASAL_RESTRICTION: 0
OD_SUPERIOR_TEMPORAL_RESTRICTION: 1
OD_INFERIOR_TEMPORAL_RESTRICTION: 1
OD_INFERIOR_NASAL_RESTRICTION: 1
OS_NORMAL: 1
OD_SUPERIOR_NASAL_RESTRICTION: 1
OS_SUPERIOR_TEMPORAL_RESTRICTION: 0
OS_SUPERIOR_NASAL_RESTRICTION: 0
OS_INFERIOR_TEMPORAL_RESTRICTION: 0

## 2024-02-13 ASSESSMENT — TONOMETRY
IOP_METHOD: GOLDMANN APPLANATION
OD_IOP_MMHG: 39
OS_IOP_MMHG: 16

## 2024-02-13 ASSESSMENT — EXTERNAL EXAM - RIGHT EYE: OD_EXAM: NORMAL

## 2024-02-13 ASSESSMENT — PACHYMETRY
OS_CT(UM): 589
OD_CT(UM): 584

## 2024-02-13 ASSESSMENT — EXTERNAL EXAM - LEFT EYE: OS_EXAM: NORMAL

## 2024-02-13 ASSESSMENT — CUP TO DISC RATIO: OS_RATIO: 0.7

## 2024-02-13 ASSESSMENT — SLIT LAMP EXAM - LIDS
COMMENTS: NORMAL
COMMENTS: NORMAL

## 2024-02-13 NOTE — PROGRESS NOTES
Assessment/Plan   Diagnoses and all orders for this visit:  Proliferative diabetic retinopathy of left eye with macular edema associated with type 2 diabetes mellitus (CMS/HCC)  -     OCT, Retina - OU - Both Eyes  Neovascular glaucoma of right eye, severe stage  Primary open angle glaucoma (POAG) of right eye, severe stage     Impression    1 E11.3512 Controlled diabetes mellitus with left eye affected by proliferative retinopathy and macular edema-Improving  2 H40.1122 Primary open angle glaucoma of left eye, moderate stage-Stable  3 H40.51X3 Neovascular glaucoma of right eye, severe stage-Stable  4 H26.9 Bilateral cataracts-Stable  5 H43.12 Vitreous hemorrhage of left eye-Stable  6 H52.203 Astigmatism, bilateral-Stable  7 H52.4 Bilateral presbyopia-Stable          Discussion    1. PDR OU. NVG OD vit hem os got treated with Eylea OS. today clear vitreous   f/u 3m. has PDR DME OS  E11.3512   sugery OS avoided now focus on DME OS DME vastly improved        The right eye was being treated to keep eye from being too painful but today no pain    no treatment today OD  needs treatment for left eye    however left eye he has DME  Exam and OCT reveal signifant edema today    Patient is approved for Vabysmo    Treatment options for DME OS discussed  Treatment options for DME OS discussed, including observation, anti-VEGF injections (including Avastin, Lucentis, Vabysmo and Eylea) and laser. Recommend anti-VEGF injections. Vabysmo done OS today in a sterile manner with Betadine 5% for antisepsis.        2. 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  There is flow thru the BGI shunt OD; right eye is NLP and comfortable.  IOP is borderline controlled OS, but acceptable given rel healthy ON.  Pt is monocular. Latanoprost --> no effect       Follows with Dr. Servando guillermo OU BID            cont travatan OS QHS            f/u 3 months              3.  Cataracts OD>OS:  not significant  OD and mild-moderately visually significant OS.             Plan    RTC 6 weeks

## 2024-02-15 ENCOUNTER — OFFICE VISIT (OUTPATIENT)
Dept: PRIMARY CARE | Facility: CLINIC | Age: 74
End: 2024-02-15
Payer: MEDICARE

## 2024-02-15 VITALS — DIASTOLIC BLOOD PRESSURE: 81 MMHG | WEIGHT: 210.5 LBS | SYSTOLIC BLOOD PRESSURE: 141 MMHG

## 2024-02-15 DIAGNOSIS — Z00.00 HEALTH CARE MAINTENANCE: Primary | ICD-10-CM

## 2024-02-15 DIAGNOSIS — E11.9 TYPE 2 DIABETES MELLITUS WITHOUT COMPLICATION, UNSPECIFIED WHETHER LONG TERM INSULIN USE (MULTI): ICD-10-CM

## 2024-02-15 DIAGNOSIS — N28.9 RENAL IMPAIRMENT: ICD-10-CM

## 2024-02-15 DIAGNOSIS — I10 PRIMARY HYPERTENSION: ICD-10-CM

## 2024-02-15 DIAGNOSIS — E78.2 MIXED HYPERLIPIDEMIA: ICD-10-CM

## 2024-02-15 PROCEDURE — 3048F LDL-C <100 MG/DL: CPT | Performed by: INTERNAL MEDICINE

## 2024-02-15 PROCEDURE — 99214 OFFICE O/P EST MOD 30 MIN: CPT | Performed by: INTERNAL MEDICINE

## 2024-02-15 PROCEDURE — 93000 ELECTROCARDIOGRAM COMPLETE: CPT | Performed by: INTERNAL MEDICINE

## 2024-02-15 PROCEDURE — 3052F HG A1C>EQUAL 8.0%<EQUAL 9.0%: CPT | Performed by: INTERNAL MEDICINE

## 2024-02-15 PROCEDURE — 1159F MED LIST DOCD IN RCRD: CPT | Performed by: INTERNAL MEDICINE

## 2024-02-15 PROCEDURE — G0439 PPPS, SUBSEQ VISIT: HCPCS | Performed by: INTERNAL MEDICINE

## 2024-02-15 PROCEDURE — 1160F RVW MEDS BY RX/DR IN RCRD: CPT | Performed by: INTERNAL MEDICINE

## 2024-02-15 PROCEDURE — 1170F FXNL STATUS ASSESSED: CPT | Performed by: INTERNAL MEDICINE

## 2024-02-15 PROCEDURE — 3077F SYST BP >= 140 MM HG: CPT | Performed by: INTERNAL MEDICINE

## 2024-02-15 PROCEDURE — 4010F ACE/ARB THERAPY RXD/TAKEN: CPT | Performed by: INTERNAL MEDICINE

## 2024-02-15 PROCEDURE — 3079F DIAST BP 80-89 MM HG: CPT | Performed by: INTERNAL MEDICINE

## 2024-02-15 PROCEDURE — 1036F TOBACCO NON-USER: CPT | Performed by: INTERNAL MEDICINE

## 2024-02-15 NOTE — PROGRESS NOTES
Subjective   Patient ID: David Ko is a 73 y.o. male who presents for No chief complaint on file..    HPI CPE see updated front sheet no chest pain no shortness of breath no side effect with medication has been exercising by working at the [employer] climbs 9-10 flights of stairs per day no issues with the knees no polyuria polydipsia Home blood sugars between 120 and 200 no hypoglycemia bowels normal    Past medical history diabetes mellitus hypertension hyperlipidemia kidney disease    Medications noted and unchanged    Allergies noted and unchanged no known drug allergies    Social history no tobacco    Family history noted and unchanged    Prevention discussed with PSA discussed with colonoscopy discussed with prior laboratory results some exercise    Depression screen not depressed    Review of Systems    Objective   There were no vitals taken for this visit.    Physical Exam      Vital signs noted alert and oriented x 3 NCAT PERRLA EOMI nares without discharge OP benign TM normal bilateral EAC clear bilateral no AC nodes no JVD or bruit no thyromegaly chest clear to auscultation and percussion CV regular rate and rhythm S1-S2 without murmur gallop or rub abdomen soft nontender normal active bowel sounds LS spine normal curvature negative straight leg raise extremities no clubbing cyanosis trace pitting and non pitting edema normal distal pulses DTR 2+    Assessment/Plan impression General medical examination diabetes mellitus hypertension hyperlipidemia other diagnoses renal impairment  Plan follow-up on the kidney function check EKG advised on heart check A1c advised on long-term blood sugar test continue with blood pressure management and medication check hepatic panel lipid panel advised on liver enzymes as well as cholesterol and cholesterol medicine discussed with prostate testing discussed with colonoscopy follow-up with ophthalmology recheck 3 months based on above TT 60 cc 31  Advised on blood  pressure reviewing the normal notes he says he is seeing a urologist and has had his PSA tested did not see in the EMR will review does not want colonoscopy

## 2024-02-16 ENCOUNTER — LAB (OUTPATIENT)
Dept: LAB | Facility: LAB | Age: 74
End: 2024-02-16
Payer: MEDICARE

## 2024-02-16 DIAGNOSIS — E78.2 MIXED HYPERLIPIDEMIA: ICD-10-CM

## 2024-02-16 DIAGNOSIS — E11.9 TYPE 2 DIABETES MELLITUS WITHOUT COMPLICATION, UNSPECIFIED WHETHER LONG TERM INSULIN USE (MULTI): ICD-10-CM

## 2024-02-16 LAB
ALBUMIN SERPL BCP-MCNC: 4.3 G/DL (ref 3.4–5)
ALP SERPL-CCNC: 98 U/L (ref 33–136)
ALT SERPL W P-5'-P-CCNC: 16 U/L (ref 10–52)
AST SERPL W P-5'-P-CCNC: 28 U/L (ref 9–39)
BILIRUB DIRECT SERPL-MCNC: 0.2 MG/DL (ref 0–0.3)
BILIRUB SERPL-MCNC: 0.9 MG/DL (ref 0–1.2)
CHOLEST SERPL-MCNC: 154 MG/DL (ref 0–199)
CHOLESTEROL/HDL RATIO: 3.2
EST. AVERAGE GLUCOSE BLD GHB EST-MCNC: 183 MG/DL
HBA1C MFR BLD: 8 %
HDLC SERPL-MCNC: 47.9 MG/DL
LDLC SERPL CALC-MCNC: 87 MG/DL
NON HDL CHOLESTEROL: 106 MG/DL (ref 0–149)
PROT SERPL-MCNC: 7.1 G/DL (ref 6.4–8.2)
TRIGL SERPL-MCNC: 96 MG/DL (ref 0–149)
VLDL: 19 MG/DL (ref 0–40)

## 2024-02-16 PROCEDURE — 80076 HEPATIC FUNCTION PANEL: CPT

## 2024-02-16 PROCEDURE — 83036 HEMOGLOBIN GLYCOSYLATED A1C: CPT

## 2024-02-16 PROCEDURE — 36415 COLL VENOUS BLD VENIPUNCTURE: CPT

## 2024-02-16 PROCEDURE — 80061 LIPID PANEL: CPT

## 2024-02-18 ASSESSMENT — ACTIVITIES OF DAILY LIVING (ADL)
TAKING_MEDICATION: INDEPENDENT
DOING_HOUSEWORK: INDEPENDENT
DRESSING: INDEPENDENT
MANAGING_FINANCES: INDEPENDENT
BATHING: INDEPENDENT
GROCERY_SHOPPING: INDEPENDENT

## 2024-02-18 ASSESSMENT — ENCOUNTER SYMPTOMS
LOSS OF SENSATION IN FEET: 0
OCCASIONAL FEELINGS OF UNSTEADINESS: 0
DEPRESSION: 0

## 2024-02-18 ASSESSMENT — PATIENT HEALTH QUESTIONNAIRE - PHQ9
1. LITTLE INTEREST OR PLEASURE IN DOING THINGS: NOT AT ALL
SUM OF ALL RESPONSES TO PHQ9 QUESTIONS 1 AND 2: 0
2. FEELING DOWN, DEPRESSED OR HOPELESS: NOT AT ALL

## 2024-02-29 ENCOUNTER — OFFICE VISIT (OUTPATIENT)
Dept: NEPHROLOGY | Facility: CLINIC | Age: 74
End: 2024-02-29
Payer: MEDICARE

## 2024-02-29 ENCOUNTER — LAB (OUTPATIENT)
Dept: LAB | Facility: LAB | Age: 74
End: 2024-02-29
Payer: MEDICARE

## 2024-02-29 VITALS
WEIGHT: 214 LBS | SYSTOLIC BLOOD PRESSURE: 174 MMHG | HEART RATE: 69 BPM | DIASTOLIC BLOOD PRESSURE: 88 MMHG | HEIGHT: 73 IN | BODY MASS INDEX: 28.36 KG/M2

## 2024-02-29 DIAGNOSIS — N18.32 CHRONIC KIDNEY DISEASE, STAGE 3B (MULTI): ICD-10-CM

## 2024-02-29 DIAGNOSIS — I10 PRIMARY HYPERTENSION: Primary | ICD-10-CM

## 2024-02-29 DIAGNOSIS — I10 PRIMARY HYPERTENSION: ICD-10-CM

## 2024-02-29 LAB
25(OH)D3 SERPL-MCNC: 18 NG/ML (ref 30–100)
ALBUMIN SERPL BCP-MCNC: 4.6 G/DL (ref 3.4–5)
ANION GAP SERPL CALC-SCNC: 13 MMOL/L (ref 10–20)
BUN SERPL-MCNC: 37 MG/DL (ref 6–23)
CALCIUM SERPL-MCNC: 10 MG/DL (ref 8.6–10.6)
CHLORIDE SERPL-SCNC: 101 MMOL/L (ref 98–107)
CO2 SERPL-SCNC: 30 MMOL/L (ref 21–32)
CREAT SERPL-MCNC: 2.31 MG/DL (ref 0.5–1.3)
CREAT UR-MCNC: 62.6 MG/DL (ref 20–370)
EGFRCR SERPLBLD CKD-EPI 2021: 29 ML/MIN/1.73M*2
GLUCOSE SERPL-MCNC: 71 MG/DL (ref 74–99)
MICROALBUMIN UR-MCNC: 77 MG/L
MICROALBUMIN/CREAT UR: 123 UG/MG CREAT
PHOSPHATE SERPL-MCNC: 3.2 MG/DL (ref 2.5–4.9)
POTASSIUM SERPL-SCNC: 3.4 MMOL/L (ref 3.5–5.3)
PTH-INTACT SERPL-MCNC: 149 PG/ML (ref 18.5–88)
SODIUM SERPL-SCNC: 141 MMOL/L (ref 136–145)

## 2024-02-29 PROCEDURE — 1036F TOBACCO NON-USER: CPT | Performed by: INTERNAL MEDICINE

## 2024-02-29 PROCEDURE — 3079F DIAST BP 80-89 MM HG: CPT | Performed by: INTERNAL MEDICINE

## 2024-02-29 PROCEDURE — 1159F MED LIST DOCD IN RCRD: CPT | Performed by: INTERNAL MEDICINE

## 2024-02-29 PROCEDURE — 3048F LDL-C <100 MG/DL: CPT | Performed by: INTERNAL MEDICINE

## 2024-02-29 PROCEDURE — 80069 RENAL FUNCTION PANEL: CPT

## 2024-02-29 PROCEDURE — 99203 OFFICE O/P NEW LOW 30 MIN: CPT | Performed by: INTERNAL MEDICINE

## 2024-02-29 PROCEDURE — 82043 UR ALBUMIN QUANTITATIVE: CPT

## 2024-02-29 PROCEDURE — 36415 COLL VENOUS BLD VENIPUNCTURE: CPT

## 2024-02-29 PROCEDURE — 3052F HG A1C>EQUAL 8.0%<EQUAL 9.0%: CPT | Performed by: INTERNAL MEDICINE

## 2024-02-29 PROCEDURE — 3077F SYST BP >= 140 MM HG: CPT | Performed by: INTERNAL MEDICINE

## 2024-02-29 PROCEDURE — 1126F AMNT PAIN NOTED NONE PRSNT: CPT | Performed by: INTERNAL MEDICINE

## 2024-02-29 PROCEDURE — 4010F ACE/ARB THERAPY RXD/TAKEN: CPT | Performed by: INTERNAL MEDICINE

## 2024-02-29 PROCEDURE — 83970 ASSAY OF PARATHORMONE: CPT

## 2024-02-29 PROCEDURE — 82306 VITAMIN D 25 HYDROXY: CPT

## 2024-02-29 PROCEDURE — 82570 ASSAY OF URINE CREATININE: CPT

## 2024-02-29 PROCEDURE — 1160F RVW MEDS BY RX/DR IN RCRD: CPT | Performed by: INTERNAL MEDICINE

## 2024-02-29 RX ORDER — LISINOPRIL 40 MG/1
40 TABLET ORAL DAILY
Qty: 90 TABLET | Refills: 3 | Status: SHIPPED | OUTPATIENT
Start: 2024-02-29 | End: 2025-02-28

## 2024-02-29 ASSESSMENT — PAIN SCALES - GENERAL: PAINLEVEL: 0-NO PAIN

## 2024-02-29 NOTE — PROGRESS NOTES
"Was following with Dr Ruvalcaba since 7 years for CKD but insurance change forced him to change providers  H/o DM since 1994. h/o retinopathy since 8 years, blind in R eye and has injections in L eye every month, no h/o neuropathy  H/o htn since 1992.Home BP 140s/70s, h/o ER visit/hospitalization for uncontrolled htn once many years ago.  No h/o CAD/CHF/PAD/stroke/TIA  No h/o kidney stones  No h/o regular NSAID use  No h/o OTC supplements/ herbal medications use  No h/o gout  No h/o cancers  No h/o autoimmune diseases like lupus, RA, sarcoidosis,Sjogren's-h/o lupus in sister  No h/o blood clots  No h/o leg swelling/Johnson/orthopnea/hematuria/cola colored/frothy urine  FH: No dialysis/kidney stones/kidney disease  Occupational history: retired, no lead exposure  Smoking/alcohol/illicit drug use: No h/o smoking/alcohol or drug use  No h/o LUTS    RoS negative for all other systems except as noted above.    Vitals:    02/29/24 1532 02/29/24 1547 02/29/24 1548 02/29/24 1549   BP: 178/87 180/86 180/87 174/88   Pulse: 70 72 69 69   Weight: 97.1 kg (214 lb)      Height:    1.854 m (6' 1\")        No distress  HEENT:  moist, no pallor  1+ edema trino ankles  Breath sounds trino equal, clear  S1 S2 regular, normal, no rub or murmur  Abdomen soft, non tender, no CVA tenderness  AAO x3, non focal    Reviewed labs from [facility] from 10/5/23 on Care Everywhere    Current Outpatient Medications   Medication Instructions    amLODIPine (NORVASC) 10 mg, oral, Daily    aspirin 81 mg EC tablet Take 81 mg by mouth once daily. Indications: CEREBRAL THROMBOEMBOLISM PREVENTION    brimonidine (AlphaGAN) 0.2 % ophthalmic solution 1 drop, Both Eyes, 2 times daily    brimonidine-timoloL (Combigan) 0.2-0.5 % ophthalmic solution     chlorthalidone (HYGROTON) 25 mg, oral, Daily RT    clotrimazole (Lotrimin) 1 % cream Clotrimazole 1 % External Cream   Quantity: 45  Refills: 0        Start : 20-Jul-2015   Active    Comfort EZ Pen Needles 31 gauge x 3/16\" needle " "One per day    dorzolamide (Trusopt) 2 % ophthalmic solution 1 drop, ophthalmic (eye), 2 times daily    ergocalciferol (VITAMIN D-2) 50,000 Units, oral, Weekly    HumaLOG KwikPen Insulin 100 unit/mL injection INJECT 4 TO 8 UNITS UNDER THE SKIN THREE TIMES A DAY WITH MEALS    hydrALAZINE (APRESOLINE) 25 mg, oral, Daily    hydroCHLOROthiazide (HYDRODIURIL) 25 mg, oral, Daily    insulin detemir (Levemir U-100 Insulin) 100 unit/mL injection Levemir 100 UNIT/ML Subcutaneous Solution   Quantity: 10  Refills: 0        Start : 3-Mar-2015   Active    insulin glargine (Lantus Solostar U-100 Insulin) 100 unit/mL (3 mL) pen INJECT 8 UNITS UNDER THE SKIN D    insulin glargine (Lantus) 100 unit/mL (3 mL) pen Inject 20 Units subcutaneously daily at bedtime.    insulin syringe-needle U-100 31G X 5/16\" 0.3 mL syringe USE TO INJECT DAILY AS DIRECTED    latanoprost (Xalatan) 0.005 % ophthalmic solution Use 1 Drop in the left eye daily at bedtime.    lisinopril 40 mg, oral, Daily    metFORMIN XR (GLUCOPHAGE-XR) 500 mg, oral, Daily    metoprolol tartrate (Lopressor) 25 mg tablet Take 1 tablet (25 mg) by mouth once daily.    OneTouch Delica Plus Lancet 33 gauge [facility]     OneTouch Ultra Control solution     OneTouch Ultra Test strip One test per day    pravastatin (Pravachol) 10 mg tablet Pravastatin Sodium 10 MG Oral Tablet   Quantity: 90  Refills: 0        Start : 21-Jun-2019   Active    rosuvastatin (CRESTOR) 10 mg, oral, Nightly    timolol (Timoptic) 0.5 % ophthalmic solution 1 drop, Both Eyes, Daily    travoprost (Travatan Z) 0.004 % drops ophthalmic solution INSTILL 1 DROP INTO BOTH EYES BEDTIME      73-year-old patient with history of diabetes, hypertension and chronic kidney disease here to establish with new provider.    # CKD G3bA?:  Reviewed labs from [facility] from October 2023, creatinine 2.1 mg per DL with EGFR 33 mill per minute, fairly constant since 2015 with minor fluctuations.  UACR not available.  Advised patient " to repeat labs today.  Continue to avoid NSAIDs, caution with iodinated contrast.  We discussed the efficacy of SGLT2 inhibitors in slowing progression of CKD, also discussed side effects.  Provided patient with handout on dapagliflozin from up-to-date.  Encouraged him to continue to work with PCP/endocrinology for better control of diabetes.  He is currently not on metformin.    # Hypertension: Goal blood pressure 120/80 mmHg, not at goal.  Advised to validate home blood pressure cuff with office blood pressure cuff.  Monitor blood pressure at home and call if persistently more than 130/80 mmHg.  Increase lisinopril to 40 mg daily, prescription sent. he was taking hydralazine 25 mg once a day, advised to increase it to twice a day.  Continue 2.3 g/day sodium restricted diet.    RTC: 4 months

## 2024-02-29 NOTE — PATIENT INSTRUCTIONS
"Check blood and urine tests  Check your home BP cuff with an office cuff-difference of up to 5 mm is acceptable. Check BP at home, goal BP is 120/80, call me if it is more than 130/80 consistently  Take Hydralazine 25 mg twice a day, increase Lisinopril to 40 mg once a day      We discussed the group of drugs called SGLT2 inhibitors [for example Invokana or canagliflozin, Jardiance or empagliflozin and Farixga or dapagliflozin ].  We discussed emerging data regarding beneficial effects of these drugs in slowing down the rate of progression of kidney disease in patients with protein in the urine as well as reducing the rates of hospitalization with heart failure and other bad cardiovascular outcomes in patients with heart failure and heart disease.  We also discussed the severe side effects of these drugs including the risk of serious genital and urinary tract infections including fungal infections and Smiley's gangrene, euglycemic ketoacidosis, increased risk of fractures and amputations.  The other side serious side effects that we discussed with the risk of low blood sugar, low blood pressure and dehydration.We discussed the \"sick day rule\"-if you cannot eat  and drink normally, stop the medication immediately and if you don't feel better, seek help immediately. We also discussed need to hold the med 3-5 days in advance of fasting for any reason.     See you in 4 mo  "

## 2024-03-01 DIAGNOSIS — Z00.00 HEALTH CARE MAINTENANCE: ICD-10-CM

## 2024-03-02 RX ORDER — PEN NEEDLE, DIABETIC 31 GX5/16"
NEEDLE, DISPOSABLE MISCELLANEOUS
Qty: 100 EACH | Refills: 0 | Status: SHIPPED | OUTPATIENT
Start: 2024-03-02 | End: 2024-05-09

## 2024-03-04 ENCOUNTER — TELEPHONE (OUTPATIENT)
Dept: PULMONOLOGY | Facility: HOSPITAL | Age: 74
End: 2024-03-04
Payer: MEDICARE

## 2024-03-04 NOTE — TELEPHONE ENCOUNTER
Per Dr. Garner, this nurse called the patient and informed him that his kidney function is stable, vit D still low so he should continue taking ergocalciferol 50,000 units qweek. Potassium slighlty low, and Dr. Garner would like him to try to eat more high potassium foods like bananas, oranges, peanuts and peanut butter, potatoes etc.

## 2024-03-29 ENCOUNTER — OFFICE VISIT (OUTPATIENT)
Dept: OPHTHALMOLOGY | Facility: CLINIC | Age: 74
End: 2024-03-29
Payer: MEDICARE

## 2024-03-29 DIAGNOSIS — E11.3512 PROLIFERATIVE DIABETIC RETINOPATHY OF LEFT EYE WITH MACULAR EDEMA ASSOCIATED WITH TYPE 2 DIABETES MELLITUS (MULTI): Primary | ICD-10-CM

## 2024-03-29 DIAGNOSIS — H40.1113 PRIMARY OPEN ANGLE GLAUCOMA (POAG) OF RIGHT EYE, SEVERE STAGE: ICD-10-CM

## 2024-03-29 PROCEDURE — 99213 OFFICE O/P EST LOW 20 MIN: CPT | Performed by: OPHTHALMOLOGY

## 2024-03-29 ASSESSMENT — VISUAL ACUITY
OD_CC: NLP
METHOD: SNELLEN - LINEAR
OS_CC: 20/30

## 2024-03-29 ASSESSMENT — PACHYMETRY
OD_CT(UM): 584
OS_CT(UM): 589

## 2024-03-29 ASSESSMENT — CUP TO DISC RATIO: OS_RATIO: 0.7

## 2024-03-29 ASSESSMENT — ENCOUNTER SYMPTOMS: EYES NEGATIVE: 1

## 2024-03-29 ASSESSMENT — SLIT LAMP EXAM - LIDS
COMMENTS: NORMAL
COMMENTS: NORMAL

## 2024-03-29 ASSESSMENT — EXTERNAL EXAM - RIGHT EYE: OD_EXAM: NORMAL

## 2024-03-29 ASSESSMENT — TONOMETRY
IOP_METHOD: GOLDMANN APPLANATION
OS_IOP_MMHG: 20
OD_IOP_MMHG: 31

## 2024-03-29 ASSESSMENT — EXTERNAL EXAM - LEFT EYE: OS_EXAM: NORMAL

## 2024-03-29 NOTE — PROGRESS NOTES
Assessment/Plan   There are no diagnoses linked to this encounter.     Impression    1 E11.3512 Controlled diabetes mellitus with left eye affected by proliferative retinopathy and macular edema-Improving  2 H40.1122 Primary open angle glaucoma of left eye, moderate stage-Stable  3 H40.51X3 Neovascular glaucoma of right eye, severe stage-Stable  4 H26.9 Bilateral cataracts-Stable  5 H43.12 Vitreous hemorrhage of left eye-Stable  6 H52.203 Astigmatism, bilateral-Stable  7 H52.4 Bilateral presbyopia-Stable          Discussion    1. PDR OU. NVG OD vit hem os got treated with Eylea OS. today clear vitreous   f/u 3m. has PDR DME OS  E11.3512   sugery OS avoided now focus on DME OS DME vastly improved        The right eye was being treated to keep eye from being too painful but today no pain    no treatment today OD  needs treatment for left eye    however left eye he has DME  Exam and OCT reveal signifant edema today    Patient is approved for Vabysmo     No injewctions        2. 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  There is flow thru the BGI shunt OD; right eye is NLP and comfortable.  IOP is borderline controlled OS, but acceptable given rel healthy ON.  Pt is monocular. Latanoprost --> no effect       Follows with Dr. Servando guillermo OU BID            cont travatan OS QHS            f/u 3 months              3.  Cataracts OD>OS:  not significant OD and mild-moderately visually significant OS.             Plan    RTC 6 weeks

## 2024-04-23 DIAGNOSIS — I10 BENIGN ESSENTIAL HYPERTENSION: ICD-10-CM

## 2024-04-23 DIAGNOSIS — I10 PRIMARY HYPERTENSION: ICD-10-CM

## 2024-04-23 RX ORDER — CHLORTHALIDONE 25 MG/1
25 TABLET ORAL DAILY
Qty: 90 TABLET | Refills: 1 | Status: SHIPPED | OUTPATIENT
Start: 2024-04-23

## 2024-04-23 RX ORDER — AMLODIPINE BESYLATE 10 MG/1
10 TABLET ORAL DAILY
Qty: 90 TABLET | Refills: 0 | Status: SHIPPED | OUTPATIENT
Start: 2024-04-23

## 2024-05-08 DIAGNOSIS — Z00.00 HEALTH CARE MAINTENANCE: ICD-10-CM

## 2024-05-09 RX ORDER — PEN NEEDLE, DIABETIC 31 GX5/16"
NEEDLE, DISPOSABLE MISCELLANEOUS
Qty: 100 EACH | Refills: 3 | Status: SHIPPED | OUTPATIENT
Start: 2024-05-09

## 2024-05-10 ENCOUNTER — OFFICE VISIT (OUTPATIENT)
Dept: OPHTHALMOLOGY | Facility: CLINIC | Age: 74
End: 2024-05-10
Payer: MEDICARE

## 2024-05-10 DIAGNOSIS — H40.89 GLAUCOMA ASSOCIATED WITH VASCULAR DISORDERS: ICD-10-CM

## 2024-05-10 DIAGNOSIS — H40.51X3 NEOVASCULAR GLAUCOMA OF RIGHT EYE, SEVERE STAGE: ICD-10-CM

## 2024-05-10 DIAGNOSIS — E11.3512 PROLIFERATIVE DIABETIC RETINOPATHY OF LEFT EYE WITH MACULAR EDEMA ASSOCIATED WITH TYPE 2 DIABETES MELLITUS (MULTI): Primary | ICD-10-CM

## 2024-05-10 PROCEDURE — 67028 INJECTION EYE DRUG: CPT | Mod: LEFT SIDE | Performed by: OPHTHALMOLOGY

## 2024-05-10 ASSESSMENT — EXTERNAL EXAM - LEFT EYE: OS_EXAM: NORMAL

## 2024-05-10 ASSESSMENT — SLIT LAMP EXAM - LIDS
COMMENTS: NORMAL
COMMENTS: NORMAL

## 2024-05-10 ASSESSMENT — CONF VISUAL FIELD
OS_SUPERIOR_NASAL_RESTRICTION: 0
OS_SUPERIOR_TEMPORAL_RESTRICTION: 0
OS_INFERIOR_TEMPORAL_RESTRICTION: 0
OD_INFERIOR_TEMPORAL_RESTRICTION: 1
METHOD: COUNTING FINGERS
OS_NORMAL: 1
OD_SUPERIOR_TEMPORAL_RESTRICTION: 1
OD_SUPERIOR_NASAL_RESTRICTION: 1
OS_INFERIOR_NASAL_RESTRICTION: 0
OD_INFERIOR_NASAL_RESTRICTION: 1

## 2024-05-10 ASSESSMENT — VISUAL ACUITY
OD_CC: NLP
OS_CC: 20/30
METHOD: SNELLEN - LINEAR
OS_CC+: -1

## 2024-05-10 ASSESSMENT — TONOMETRY
OD_IOP_MMHG: 29
OS_IOP_MMHG: 18
IOP_METHOD: TONOPEN

## 2024-05-10 ASSESSMENT — PACHYMETRY
OD_CT(UM): 584
OS_CT(UM): 589

## 2024-05-10 ASSESSMENT — EXTERNAL EXAM - RIGHT EYE: OD_EXAM: NORMAL

## 2024-05-10 ASSESSMENT — CUP TO DISC RATIO: OS_RATIO: 0.7

## 2024-05-10 NOTE — PROGRESS NOTES
Assessment/Plan   Diagnoses and all orders for this visit:  Proliferative diabetic retinopathy of left eye with macular edema associated with type 2 diabetes mellitus (Multi)  Neovascular glaucoma of right eye, severe stage  Glaucoma associated with vascular disorders         Impression    1 E11.3512 Controlled diabetes mellitus with left eye affected by proliferative retinopathy and macular edema-Improving  2 H40.1122 Primary open angle glaucoma of left eye, moderate stage-Stable  3 H40.51X3 Neovascular glaucoma of right eye, severe stage-Stable  4 H26.9 Bilateral cataracts-Stable  5 H43.12 Vitreous hemorrhage of left eye-Stable  6 H52.203 Astigmatism, bilateral-Stable  7 H52.4 Bilateral presbyopia-Stable          Discussion    1. PDR OU. NVG OD vit hem os got treated with Eylea OS. today clear vitreous   f/u 3m. has PDR DME OS  E11.3512   sugery OS avoided now focus on DME OS DME vastly improved        The right eye was being treated to keep eye from being too painful but today no pain    no treatment today OD  needs treatment for left eye    however left eye he has DME  Exam and OCT reveal signifant edema today    Patient is approved for Vabysmo    Treatment options for DME OS discussed  Treatment options for DME OS discussed, including observation, anti-VEGF injections (including Avastin, Lucentis, Vabysmo and Eylea) and laser. Recommend anti-VEGF injections. Vabysmo done OS today in a sterile manner with Betadine 5% for antisepsis.        2. 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  There is flow thru the BGI shunt OD; right eye is NLP and comfortable.  IOP is borderline controlled OS, but acceptable given rel healthy ON.  Pt is monocular. Latanoprost --> no effect       Follows with Dr. Servando guillermo OU BID            cont travatan OS QHS            f/u 3 months              3.  Cataracts OD>OS:  not significant OD and mild-moderately visually significant OS.              Plan    RTC 6 weeks

## 2024-05-15 ENCOUNTER — APPOINTMENT (OUTPATIENT)
Dept: OPHTHALMOLOGY | Facility: CLINIC | Age: 74
End: 2024-05-15
Payer: MEDICARE

## 2024-06-10 ENCOUNTER — OFFICE VISIT (OUTPATIENT)
Dept: PRIMARY CARE | Facility: CLINIC | Age: 74
End: 2024-06-10
Payer: MEDICARE

## 2024-06-10 ENCOUNTER — LAB (OUTPATIENT)
Dept: LAB | Facility: LAB | Age: 74
End: 2024-06-10
Payer: MEDICARE

## 2024-06-10 VITALS — DIASTOLIC BLOOD PRESSURE: 74 MMHG | SYSTOLIC BLOOD PRESSURE: 123 MMHG | WEIGHT: 217 LBS | BODY MASS INDEX: 28.63 KG/M2

## 2024-06-10 DIAGNOSIS — I10 PRIMARY HYPERTENSION: ICD-10-CM

## 2024-06-10 DIAGNOSIS — E11.9 TYPE 2 DIABETES MELLITUS WITHOUT COMPLICATION, UNSPECIFIED WHETHER LONG TERM INSULIN USE (MULTI): ICD-10-CM

## 2024-06-10 DIAGNOSIS — E11.9 TYPE 2 DIABETES MELLITUS WITHOUT COMPLICATION, UNSPECIFIED WHETHER LONG TERM INSULIN USE (MULTI): Primary | ICD-10-CM

## 2024-06-10 LAB
EST. AVERAGE GLUCOSE BLD GHB EST-MCNC: 169 MG/DL
HBA1C MFR BLD: 7.5 %

## 2024-06-10 PROCEDURE — 3078F DIAST BP <80 MM HG: CPT | Performed by: INTERNAL MEDICINE

## 2024-06-10 PROCEDURE — 83036 HEMOGLOBIN GLYCOSYLATED A1C: CPT

## 2024-06-10 PROCEDURE — 3051F HG A1C>EQUAL 7.0%<8.0%: CPT | Performed by: INTERNAL MEDICINE

## 2024-06-10 PROCEDURE — 3048F LDL-C <100 MG/DL: CPT | Performed by: INTERNAL MEDICINE

## 2024-06-10 PROCEDURE — 36415 COLL VENOUS BLD VENIPUNCTURE: CPT

## 2024-06-10 PROCEDURE — 3060F POS MICROALBUMINURIA REV: CPT | Performed by: INTERNAL MEDICINE

## 2024-06-10 PROCEDURE — 4010F ACE/ARB THERAPY RXD/TAKEN: CPT | Performed by: INTERNAL MEDICINE

## 2024-06-10 PROCEDURE — 3074F SYST BP LT 130 MM HG: CPT | Performed by: INTERNAL MEDICINE

## 2024-06-10 PROCEDURE — 99213 OFFICE O/P EST LOW 20 MIN: CPT | Performed by: INTERNAL MEDICINE

## 2024-06-10 NOTE — PROGRESS NOTES
Subjective   Patient ID: David Ko is a 73 y.o. male who presents for No chief complaint on file..    HPI follow-up visit no chest pain no shortness of breath no polyuria polydipsia no hypoglycemic events his home blood sugars no Decreased from about 180 to about 130 even though he has not lost much weight he is drinking more water he is taking hydrochlorothiazide not chlorthalidone still has some edema    Review of Systems    Objective   There were no vitals taken for this visit.    Physical Exam vital signs noted alert and oriented x 3 NCAT no JVD or bruit chest clear to auscultation CV regular rate and rhythm S1-S2 extremities no clubbing cyanosis or edema trace if any nonpitting intact distal pulses    Assessment/Plan     Impression type 2 diabetes hypertension other diagnoses  Plan check A1c advised on long-term blood sugar test he is taking 4 units of short acting insulin with meals and 12 units at bedtime of longer acting insulin may adjust if glycohemoglobin not around 7 continue with blood pressure management and medicine watching salt elevate legs continue to exercise

## 2024-06-17 ENCOUNTER — APPOINTMENT (OUTPATIENT)
Dept: OPHTHALMOLOGY | Facility: CLINIC | Age: 74
End: 2024-06-17
Payer: MEDICARE

## 2024-07-11 ENCOUNTER — APPOINTMENT (OUTPATIENT)
Dept: NEPHROLOGY | Facility: CLINIC | Age: 74
End: 2024-07-11
Payer: MEDICARE

## 2024-07-11 ENCOUNTER — LAB (OUTPATIENT)
Dept: LAB | Facility: LAB | Age: 74
End: 2024-07-11
Payer: MEDICARE

## 2024-07-11 VITALS
BODY MASS INDEX: 28.23 KG/M2 | HEART RATE: 69 BPM | SYSTOLIC BLOOD PRESSURE: 162 MMHG | DIASTOLIC BLOOD PRESSURE: 81 MMHG | WEIGHT: 214 LBS

## 2024-07-11 DIAGNOSIS — N18.32 CHRONIC KIDNEY DISEASE, STAGE 3B (MULTI): ICD-10-CM

## 2024-07-11 DIAGNOSIS — N18.32 CHRONIC KIDNEY DISEASE, STAGE 3B (MULTI): Primary | ICD-10-CM

## 2024-07-11 LAB
ALBUMIN SERPL BCP-MCNC: 4.3 G/DL (ref 3.4–5)
ANION GAP SERPL CALC-SCNC: 13 MMOL/L (ref 10–20)
BUN SERPL-MCNC: 31 MG/DL (ref 6–23)
CALCIUM SERPL-MCNC: 9.5 MG/DL (ref 8.6–10.6)
CHLORIDE SERPL-SCNC: 109 MMOL/L (ref 98–107)
CO2 SERPL-SCNC: 26 MMOL/L (ref 21–32)
CREAT SERPL-MCNC: 2.64 MG/DL (ref 0.5–1.3)
CREAT UR-MCNC: 109.2 MG/DL (ref 20–370)
EGFRCR SERPLBLD CKD-EPI 2021: 25 ML/MIN/1.73M*2
GLUCOSE SERPL-MCNC: 156 MG/DL (ref 74–99)
MICROALBUMIN UR-MCNC: 29.4 MG/L
MICROALBUMIN/CREAT UR: 26.9 UG/MG CREAT
PHOSPHATE SERPL-MCNC: 4.1 MG/DL (ref 2.5–4.9)
POTASSIUM SERPL-SCNC: 5 MMOL/L (ref 3.5–5.3)
PTH-INTACT SERPL-MCNC: 186.7 PG/ML (ref 18.5–88)
SODIUM SERPL-SCNC: 143 MMOL/L (ref 136–145)

## 2024-07-11 PROCEDURE — 99213 OFFICE O/P EST LOW 20 MIN: CPT | Performed by: INTERNAL MEDICINE

## 2024-07-11 PROCEDURE — 3048F LDL-C <100 MG/DL: CPT | Performed by: INTERNAL MEDICINE

## 2024-07-11 PROCEDURE — 80069 RENAL FUNCTION PANEL: CPT

## 2024-07-11 PROCEDURE — 4010F ACE/ARB THERAPY RXD/TAKEN: CPT | Performed by: INTERNAL MEDICINE

## 2024-07-11 PROCEDURE — 82570 ASSAY OF URINE CREATININE: CPT

## 2024-07-11 PROCEDURE — 1159F MED LIST DOCD IN RCRD: CPT | Performed by: INTERNAL MEDICINE

## 2024-07-11 PROCEDURE — 3051F HG A1C>EQUAL 7.0%<8.0%: CPT | Performed by: INTERNAL MEDICINE

## 2024-07-11 PROCEDURE — 83970 ASSAY OF PARATHORMONE: CPT

## 2024-07-11 PROCEDURE — 1036F TOBACCO NON-USER: CPT | Performed by: INTERNAL MEDICINE

## 2024-07-11 PROCEDURE — 3079F DIAST BP 80-89 MM HG: CPT | Performed by: INTERNAL MEDICINE

## 2024-07-11 PROCEDURE — 82043 UR ALBUMIN QUANTITATIVE: CPT

## 2024-07-11 PROCEDURE — 3060F POS MICROALBUMINURIA REV: CPT | Performed by: INTERNAL MEDICINE

## 2024-07-11 PROCEDURE — 3077F SYST BP >= 140 MM HG: CPT | Performed by: INTERNAL MEDICINE

## 2024-07-11 PROCEDURE — 36415 COLL VENOUS BLD VENIPUNCTURE: CPT

## 2024-07-11 NOTE — PROGRESS NOTES
"For follow up, doing well.  No complaints  No hospitalizations/illness since last visit   BP at home not higher than 130/80  Denies orthostatic symptoms  No SoB/fatigue/wt loss/loss of energy or appetite    RoS negative for all other systems except as noted above.    Vitals:    07/11/24 1355   BP: 162/81   Pulse: 69       No distress  HEENT:  moist, no pallor  1+ edema trino LE  Breath sounds trino equal, clear  S1 S2 regular, normal, no rub or murmur  Abdomen soft  AAO x3, non focal    Lab Results   Component Value Date     02/29/2024     01/16/2023    K 3.4 (L) 02/29/2024    K 4.0 01/16/2023     02/29/2024     01/16/2023    CO2 30 02/29/2024    CO2 29 01/16/2023    BUN 37 (H) 02/29/2024    BUN 31 (H) 01/16/2023    CREATININE 2.31 (H) 02/29/2024    CREATININE 2.22 (H) 01/16/2023    CALCIUM 10.0 02/29/2024    CALCIUM 9.8 01/16/2023    PHOS 3.2 02/29/2024    VITD25 18 (L) 02/29/2024    MICROALBCREA 123.0 (H) 02/29/2024      Current Outpatient Medications   Medication Instructions    amLODIPine (NORVASC) 10 mg, oral, Daily    aspirin 81 mg EC tablet Take 81 mg by mouth once daily. Indications: CEREBRAL THROMBOEMBOLISM PREVENTION    brimonidine (AlphaGAN) 0.2 % ophthalmic solution 1 drop, Both Eyes, 2 times daily    brimonidine-timoloL (Combigan) 0.2-0.5 % ophthalmic solution     chlorthalidone (HYGROTON) 25 mg, oral, Daily    clotrimazole (Lotrimin) 1 % cream Clotrimazole 1 % External Cream   Quantity: 45  Refills: 0        Start : 20-Jul-2015   Active    Comfort EZ Pen Needles 31 gauge x 3/16\" needle USE 1 PEN NEEDLE DAILY    dorzolamide (Trusopt) 2 % ophthalmic solution 1 drop, ophthalmic (eye), 2 times daily    ergocalciferol (VITAMIN D-2) 50,000 Units, oral, Once Weekly    HumaLOG KwikPen Insulin 100 unit/mL injection INJECT 4 TO 8 UNITS UNDER THE SKIN THREE TIMES A DAY WITH MEALS    hydrALAZINE (APRESOLINE) 25 mg, oral, Daily    hydroCHLOROthiazide (HYDRODIURIL) 25 mg, oral, Daily    insulin " "detemir (Levemir U-100 Insulin) 100 unit/mL injection Levemir 100 UNIT/ML Subcutaneous Solution   Quantity: 10  Refills: 0        Start : 3-Mar-2015   Active    insulin glargine (Lantus Solostar U-100 Insulin) 100 unit/mL (3 mL) pen INJECT 8 UNITS UNDER THE SKIN D    insulin glargine (Lantus) 100 unit/mL (3 mL) pen 12 Units once daily at bedtime.    insulin syringe-needle U-100 31G X 5/16\" 0.3 mL syringe USE TO INJECT DAILY AS DIRECTED    latanoprost (Xalatan) 0.005 % ophthalmic solution Use 1 Drop in the left eye daily at bedtime.    lisinopril 40 mg, oral, Daily    metFORMIN XR (GLUCOPHAGE-XR) 500 mg, oral, Daily    metoprolol tartrate (Lopressor) 25 mg tablet Take 1 tablet (25 mg) by mouth once daily.    OneTouch Delica Plus Lancet 33 gauge misc     OneTouch Ultra Control solution     OneTouch Ultra Test strip One test per day    pravastatin (Pravachol) 10 mg tablet Pravastatin Sodium 10 MG Oral Tablet   Quantity: 90  Refills: 0        Start : 21-Jun-2019   Active    rosuvastatin (CRESTOR) 10 mg, oral, Nightly    timolol (Timoptic) 0.5 % ophthalmic solution 1 drop, Both Eyes, Daily    travoprost (Travatan Z) 0.004 % drops ophthalmic solution INSTILL 1 DROP INTO BOTH EYES BEDTIME     73-year-old patient with history of diabetes, hypertension and chronic kidney disease here to establish with new provider.     # CKD G3bA?:  Reviewed labs from 2/29/24, no labs since.  Advised patient to repeat labs today.  Continue to avoid NSAIDs, caution with iodinated contrast.  We again discussed the efficacy of SGLT2 inhibitors in slowing progression of CKD,    recommend starting jardiance 10 mg PO daily, rx sent    # Hypertension: Goal blood pressure 120/80 mmHg, close to goal per home readings. CT lisinopril to 40 mg daily, hydralazine 25 mg twice a day., amlodipine 10 mg/day, hydrochlorothiazide 25 mg/day, metoprolol 25 mg once a day. Continue 2.3 g/day sodium restricted diet.     RTC: 4 months     "

## 2024-07-19 ENCOUNTER — APPOINTMENT (OUTPATIENT)
Dept: OPHTHALMOLOGY | Facility: CLINIC | Age: 74
End: 2024-07-19
Payer: MEDICARE

## 2024-07-19 DIAGNOSIS — H34.8120 HEMISPHERIC RETINAL VEIN OCCLUSION WITH MACULAR EDEMA OF LEFT EYE (CMS-HCC): ICD-10-CM

## 2024-07-19 DIAGNOSIS — E11.3512 PROLIFERATIVE DIABETIC RETINOPATHY OF LEFT EYE WITH MACULAR EDEMA ASSOCIATED WITH TYPE 2 DIABETES MELLITUS (MULTI): Primary | ICD-10-CM

## 2024-07-19 PROCEDURE — 92134 CPTRZ OPH DX IMG PST SGM RTA: CPT | Performed by: OPHTHALMOLOGY

## 2024-07-19 PROCEDURE — 67028 INJECTION EYE DRUG: CPT | Mod: LEFT SIDE | Performed by: OPHTHALMOLOGY

## 2024-07-19 ASSESSMENT — CONF VISUAL FIELD
OD_SUPERIOR_NASAL_RESTRICTION: 1
OS_SUPERIOR_TEMPORAL_RESTRICTION: 0
OD_SUPERIOR_TEMPORAL_RESTRICTION: 1
OS_SUPERIOR_NASAL_RESTRICTION: 0
OS_INFERIOR_NASAL_RESTRICTION: 0
OS_NORMAL: 1
OD_INFERIOR_TEMPORAL_RESTRICTION: 1
OD_INFERIOR_NASAL_RESTRICTION: 1
OS_INFERIOR_TEMPORAL_RESTRICTION: 0

## 2024-07-19 ASSESSMENT — ENCOUNTER SYMPTOMS: EYES NEGATIVE: 1

## 2024-07-19 ASSESSMENT — TONOMETRY
OD_IOP_MMHG: 32
IOP_METHOD: GOLDMANN APPLANATION
OS_IOP_MMHG: 12

## 2024-07-19 ASSESSMENT — VISUAL ACUITY
METHOD: SNELLEN - LINEAR
OD_CC: NLP
OS_CC: 20/60-2
OS_PH_CC: 20/50

## 2024-07-19 ASSESSMENT — PACHYMETRY
OS_CT(UM): 589
OD_CT(UM): 584

## 2024-07-19 NOTE — PROGRESS NOTES
Assessment/Plan   Diagnoses and all orders for this visit:  Proliferative diabetic retinopathy of left eye with macular edema associated with type 2 diabetes mellitus (Multi)  -     OCT, Retina - OU - Both Eyes  Hemispheric retinal vein occlusion with macular edema of left eye (CMS-[facility])  -     Intravitreal Injection, Pharmacologic Agent - OS - Left Eye       Assessment/Plan   Problem List Items Addressed This Visit             ICD-10-CM    Proliferative diabetic retinopathy of left eye with macular edema associated with type 2 diabetes mellitus (Multi) - Primary E11.3512    Relevant Orders    OCT, Retina - OU - Both Eyes     Other Visit Diagnoses         Codes    Hemispheric retinal vein occlusion with macular edema of left eye (CMS-HCC)     H34.8120    Relevant Orders    Intravitreal Injection, Pharmacologic Agent - OS - Left Eye          I         RTC 6 weeks   Impression    1 E11.3512 Controlled diabetes mellitus with left eye affected by proliferative retinopathy and macular edema-Improving  2 H40.1122 Primary open angle glaucoma of left eye, moderate stage-Stable  3 H40.51X3 Neovascular glaucoma of right eye, severe stage-Stable  4 H26.9 Bilateral cataracts-Stable  5 H43.12 Vitreous hemorrhage of left eye-Stable  6 H52.203 Astigmatism, bilateral-Stable  7 H52.4 Bilateral presbyopia-Stable    DIAGNOSTIC PROCEDURE DONE    OCT DONE OD/OS            REASON FOR TEST: will help address and tailor  therapy by detecting subclinical CME SRF     Hi quality OCT  scans obtained  signal good    OCT OD - no view  OCT OS - Normal Foveal Contour, mild  Edema, IS/OS Junction Normal    additional commnents:        Discussion    1. PDR OU. NVG OD vit hem os got treated with Eylea OS. today clear vitreous   f/u 3m. has PDR DME OS  E11.3511   sugery OS avoided now focus on DME OS DME vastly improved        The right eye was being treated to keep eye from being too painful but today no pain    no treatment today OD  needs treatment  for left eye    however left eye he has DME  and vision has declined from 20/25 to 20/40  Exam and OCT reveal signifant edema today    NPDR DME mild E11.3212    Patient is approved for Vabysmo    Treatment options for DME OS discussed  Treatment options for DME OS discussed, including observation, anti-VEGF injections (including Avastin, Lucentis, Vabysmo and Eylea) and laser. Recommend anti-VEGF injections. Vabysmo done OS today in a sterile manner with Betadine 5% for antisepsis.        2. 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  There is flow thru the BGI shunt OD; right eye is NLP and comfortable.  IOP is borderline controlled OS, but acceptable given rel healthy ON.  Pt is monocular. Latanoprost --> no effect       Follows with Dr. Servando guillermo OU BID            cont travatan OS QHS            f/u 3 months              3.  Cataracts OD>OS:  not significant OD and mild-moderately visually significant OS.             Plan    RTC 4 weeks        Plan    RTC 6 weeks

## 2024-07-22 DIAGNOSIS — H40.89 OTHER SPECIFIED GLAUCOMA: ICD-10-CM

## 2024-07-22 RX ORDER — TRAVOPROST OPHTHALMIC SOLUTION 0.04 MG/ML
SOLUTION OPHTHALMIC
Qty: 10 ML | Refills: 1 | Status: SHIPPED | OUTPATIENT
Start: 2024-07-22

## 2024-07-24 DIAGNOSIS — I10 BENIGN ESSENTIAL HYPERTENSION: ICD-10-CM

## 2024-07-24 RX ORDER — AMLODIPINE BESYLATE 10 MG/1
10 TABLET ORAL DAILY
Qty: 90 TABLET | Refills: 0 | Status: SHIPPED | OUTPATIENT
Start: 2024-07-24

## 2024-08-03 DIAGNOSIS — I10 PRIMARY HYPERTENSION: ICD-10-CM

## 2024-08-07 RX ORDER — CHLORTHALIDONE 25 MG/1
25 TABLET ORAL DAILY
Qty: 90 TABLET | Refills: 1 | Status: SHIPPED | OUTPATIENT
Start: 2024-08-07

## 2024-08-15 DIAGNOSIS — H40.89 OTHER SPECIFIED GLAUCOMA: ICD-10-CM

## 2024-08-15 RX ORDER — BRIMONIDINE TARTRATE AND TIMOLOL MALEATE 2; 5 MG/ML; MG/ML
SOLUTION OPHTHALMIC
Qty: 10 ML | Refills: 6 | Status: SHIPPED | OUTPATIENT
Start: 2024-08-15

## 2024-08-19 ENCOUNTER — APPOINTMENT (OUTPATIENT)
Dept: OPHTHALMOLOGY | Facility: CLINIC | Age: 74
End: 2024-08-19
Payer: MEDICARE

## 2024-08-19 DIAGNOSIS — H40.1121 PRIMARY OPEN-ANGLE GLAUCOMA, LEFT EYE, MILD STAGE: Primary | ICD-10-CM

## 2024-08-19 DIAGNOSIS — H40.51X3 NEOVASCULAR GLAUCOMA OF RIGHT EYE, SEVERE STAGE: ICD-10-CM

## 2024-08-19 PROCEDURE — 92133 CPTRZD OPH DX IMG PST SGM ON: CPT | Performed by: OPHTHALMOLOGY

## 2024-08-19 PROCEDURE — 92083 EXTENDED VISUAL FIELD XM: CPT | Performed by: OPHTHALMOLOGY

## 2024-08-19 PROCEDURE — 99214 OFFICE O/P EST MOD 30 MIN: CPT | Performed by: OPHTHALMOLOGY

## 2024-08-19 ASSESSMENT — VISUAL ACUITY
METHOD: SNELLEN - LINEAR
OD_CC: NLP
CORRECTION_TYPE: GLASSES
OS_PH_CC: 20/70
OS_CC: 20/150
OS_PH_CC+: +1

## 2024-08-19 ASSESSMENT — EXTERNAL EXAM - RIGHT EYE: OD_EXAM: NORMAL

## 2024-08-19 ASSESSMENT — REFRACTION_MANIFEST
OD_SPHERE: BALANCE
OS_ADD: +2.75
OS_SPHERE: +0.50
OS_AXIS: 095
OD_ADD: +2.75
OS_CYLINDER: -1.25

## 2024-08-19 ASSESSMENT — ENCOUNTER SYMPTOMS
NEUROLOGICAL NEGATIVE: 0
ALLERGIC/IMMUNOLOGIC NEGATIVE: 0
CONSTITUTIONAL NEGATIVE: 0
MUSCULOSKELETAL NEGATIVE: 0
ENDOCRINE NEGATIVE: 0
HEMATOLOGIC/LYMPHATIC NEGATIVE: 0
CARDIOVASCULAR NEGATIVE: 0
RESPIRATORY NEGATIVE: 0
EYES NEGATIVE: 0
GASTROINTESTINAL NEGATIVE: 0
PSYCHIATRIC NEGATIVE: 0

## 2024-08-19 ASSESSMENT — REFRACTION_WEARINGRX
OD_ADD: +2.75
OS_CYLINDER: -0.75
OD_SPHERE: BALANCE
OS_SPHERE: +1.25
OS_ADD: +2.75
OS_AXIS: 095

## 2024-08-19 ASSESSMENT — CONF VISUAL FIELD
OD_SUPERIOR_TEMPORAL_RESTRICTION: 1
OS_INFERIOR_TEMPORAL_RESTRICTION: 0
OD_INFERIOR_TEMPORAL_RESTRICTION: 1
OS_INFERIOR_NASAL_RESTRICTION: 0
OS_SUPERIOR_TEMPORAL_RESTRICTION: 0
OD_INFERIOR_NASAL_RESTRICTION: 1
OS_SUPERIOR_NASAL_RESTRICTION: 0
OS_NORMAL: 1
OD_SUPERIOR_NASAL_RESTRICTION: 1

## 2024-08-19 ASSESSMENT — PACHYMETRY
OS_CT(UM): 589
OD_CT(UM): 584

## 2024-08-19 ASSESSMENT — SLIT LAMP EXAM - LIDS
COMMENTS: NORMAL
COMMENTS: NORMAL

## 2024-08-19 ASSESSMENT — CUP TO DISC RATIO: OS_RATIO: 0.7

## 2024-08-19 ASSESSMENT — TONOMETRY
OD_IOP_MMHG: 58
OS_IOP_MMHG: 16
IOP_METHOD: GOLDMANN APPLANATION

## 2024-08-19 ASSESSMENT — EXTERNAL EXAM - LEFT EYE: OS_EXAM: NORMAL

## 2024-08-19 NOTE — PROGRESS NOTES
Visual Acuity (Snellen - Linear)         Right Left    Dist cc NLP 20/150    Dist ph cc  20/70 +1      Correction: Glasses          Tonometry       Tonometry (Goldmann Applanation, 8:21 AM)         Right Left    Pressure 58 16                  Assessment/Plan     Visual Acuity (Snellen - Linear)         Right Left    Dist cc NLP 20/25 +2      Correction: Glasses          Tonometry       Tonometry (Goldmann Applanation, 10:20 AM)         Right Left    Pressure 45 15                  Assessment/Plan   Last dilation:  4/17/23    1. 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  There is flow thru the Ahmed shunt OD; right eye is NLP and comfortable.  IOP remains controlled OS and acceptable given relatively healthy ON.  Pt is monocular. Latanoprost   --> no effect. IOP acceptable today, comfortable OD.  No evidence of progression      Plan:  cont combigan OU BID               cont travatan OS QHS               cont dorzolamide OS BID               f/u 2 months (lenstar, macular OCT)             2.  Cataracts OD>OS:  not visually significant OD because he is no light perception (NLP) from neovascular glaucoma (NVG) and PDR OD.  VA has quickly declined OS due to posterior subcapsular cataract (PSC) cataract.  Pt's VA improves c MRx, but is still only 20/60.  Given monocular status, will try MRx first, but we discussed possible desire/need for cataract surgery OS.        Plan:  MRx given    3.  Proliferative diabetic retinopathy~E11.359, h/o Vitreous hemorrhage of left eye. s/p avastin injection with resolution of VH 8/17/17.      Plan:  as per Dr. Hodges

## 2024-08-21 ENCOUNTER — APPOINTMENT (OUTPATIENT)
Dept: OPHTHALMOLOGY | Facility: CLINIC | Age: 74
End: 2024-08-21
Payer: MEDICARE

## 2024-08-21 DIAGNOSIS — E11.311 DIABETIC MACULAR EDEMA (MULTI): Primary | ICD-10-CM

## 2024-08-21 DIAGNOSIS — H53.40 UNSPECIFIED VISUAL FIELD DEFECTS: ICD-10-CM

## 2024-08-21 PROCEDURE — 67028 INJECTION EYE DRUG: CPT | Mod: LEFT SIDE | Performed by: OPHTHALMOLOGY

## 2024-08-21 PROCEDURE — 92134 CPTRZ OPH DX IMG PST SGM RTA: CPT | Mod: BILATERAL PROCEDURE | Performed by: OPHTHALMOLOGY

## 2024-08-21 ASSESSMENT — VISUAL ACUITY
OS_PH_SC+: -1
OD_SC: NLP
METHOD: SNELLEN - LINEAR
OS_PH_SC: 20/60
CORRECTION_TYPE: GLASSES
OS_SC: 20/80
OS_SC+: -1

## 2024-08-21 ASSESSMENT — CUP TO DISC RATIO: OS_RATIO: 0.7

## 2024-08-21 ASSESSMENT — ENCOUNTER SYMPTOMS
NEUROLOGICAL NEGATIVE: 0
ALLERGIC/IMMUNOLOGIC NEGATIVE: 0
ENDOCRINE NEGATIVE: 0
RESPIRATORY NEGATIVE: 0
EYES NEGATIVE: 0
HEMATOLOGIC/LYMPHATIC NEGATIVE: 0
MUSCULOSKELETAL NEGATIVE: 0
PSYCHIATRIC NEGATIVE: 0
CONSTITUTIONAL NEGATIVE: 0
GASTROINTESTINAL NEGATIVE: 0
CARDIOVASCULAR NEGATIVE: 0

## 2024-08-21 ASSESSMENT — PACHYMETRY
OS_CT(UM): 589
OD_CT(UM): 584

## 2024-08-21 ASSESSMENT — CONF VISUAL FIELD
OD_INFERIOR_TEMPORAL_RESTRICTION: 1
OD_INFERIOR_NASAL_RESTRICTION: 1
OD_SUPERIOR_NASAL_RESTRICTION: 1
OD_SUPERIOR_TEMPORAL_RESTRICTION: 1

## 2024-08-21 ASSESSMENT — EXTERNAL EXAM - LEFT EYE: OS_EXAM: NORMAL

## 2024-08-21 ASSESSMENT — SLIT LAMP EXAM - LIDS
COMMENTS: NORMAL
COMMENTS: NORMAL

## 2024-08-21 ASSESSMENT — EXTERNAL EXAM - RIGHT EYE: OD_EXAM: NORMAL

## 2024-08-21 ASSESSMENT — TONOMETRY
OS_IOP_MMHG: 11
OD_IOP_MMHG: 50
IOP_METHOD: GOLDMANN APPLANATION

## 2024-08-21 NOTE — PROGRESS NOTES
Assessment/Plan   Diagnoses and all orders for this visit:  Diabetic macular edema (Multi)  -     OCT, Retina - OU - Both Eyes  Unspecified visual field defects  -     OCT, Retina - OU - Both Eyes       Assessment/Plan   Problem List Items Addressed This Visit             ICD-10-CM    Diabetic macular edema (Multi) - Primary E11.311    Relevant Orders    OCT, Retina - OU - Both Eyes     Other Visit Diagnoses         Codes    Unspecified visual field defects     H53.40    Relevant Orders    OCT, Retina - OU - Both Eyes          I         RTC 6 weeks   Impression    1 E11.3512 Controlled diabetes mellitus with left eye affected by proliferative retinopathy and macular edema-Improving  2 H40.1122 Primary open angle glaucoma of left eye, moderate stage-Stable  3 H40.51X3 Neovascular glaucoma of right eye, severe stage-Stable  4 H26.9 Bilateral cataracts-Stable  5 H43.12 Vitreous hemorrhage of left eye-Stable  6 H52.203 Astigmatism, bilateral-Stable  7 H52.4 Bilateral presbyopia-Stable    DIAGNOSTIC PROCEDURE DONE    OCT DONE OD/OS            REASON FOR TEST: will help address and tailor  therapy by detecting subclinical CME SRF     Hi quality OCT  scans obtained  signal good    OCT OD - no view  OCT OS - Normal Foveal Contour, mild  Edema, IS/OS Junction Normal    additional commnents:        Discussion    1. PDR OU. NVG OD vit hem os got treated with Eylea OS. today clear vitreous   f/u 3m. has PDR DME OS  E11.3511   sugery OS avoided now focus on DME OS DME vastly improved        The right eye was being treated to keep eye from being too painful but today no pain    no treatment today OD  needs treatment for left eye    however left eye he has DME  and vision has declined from 20/25 to 20/40  Exam and OCT reveal signifant edema today    NPDR DME mild E11.3212    [facility]    Treatment options for DME OS discussed  Treatment options for DME OS discussed, including observation, anti-VEGF injections  (including Avastin, Lucentis, Vabysmo and Eylea) and laser. Recommend anti-VEGF injections. Vabysmo done OS today in a sterile manner with Betadine 5% for antisepsis.        2. 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  There is flow thru the BGI shunt OD; right eye is NLP and comfortable.  IOP is borderline controlled OS, but acceptable given rel healthy ON.  Pt is monocular. Latanoprost --> no effect       Follows with Dr. Servando guillermo OU BID            cont travatan OS QHS            f/u 3 months              3.  Cataracts OD>OS:  not significant OD and mild-moderately visually significant OS.                      Plan    RTC 6 weeks

## 2024-08-22 DIAGNOSIS — Z00.00 HEALTH CARE MAINTENANCE: ICD-10-CM

## 2024-08-22 RX ORDER — BLOOD SUGAR DIAGNOSTIC
STRIP MISCELLANEOUS
Qty: 200 STRIP | Refills: 2 | Status: SHIPPED | OUTPATIENT
Start: 2024-08-22

## 2024-08-23 ENCOUNTER — TELEPHONE (OUTPATIENT)
Dept: PRIMARY CARE | Facility: CLINIC | Age: 74
End: 2024-08-23

## 2024-09-08 DIAGNOSIS — Z00.00 HEALTH CARE MAINTENANCE: ICD-10-CM

## 2024-09-10 RX ORDER — INSULIN GLARGINE 100 [IU]/ML
INJECTION, SOLUTION SUBCUTANEOUS
Qty: 15 ML | Refills: 0 | Status: SHIPPED | OUTPATIENT
Start: 2024-09-10

## 2024-09-25 ENCOUNTER — APPOINTMENT (OUTPATIENT)
Dept: OPHTHALMOLOGY | Facility: CLINIC | Age: 74
End: 2024-09-25
Payer: MEDICARE

## 2024-09-25 DIAGNOSIS — H25.812 COMBINED FORMS OF AGE-RELATED CATARACT OF LEFT EYE: Primary | ICD-10-CM

## 2024-09-25 DIAGNOSIS — H40.1121 PRIMARY OPEN-ANGLE GLAUCOMA, LEFT EYE, MILD STAGE: ICD-10-CM

## 2024-09-25 DIAGNOSIS — E11.311 DIABETIC MACULAR EDEMA: ICD-10-CM

## 2024-09-25 DIAGNOSIS — H40.51X3 NEOVASCULAR GLAUCOMA OF RIGHT EYE, SEVERE STAGE: ICD-10-CM

## 2024-09-25 DIAGNOSIS — E11.3512 PROLIFERATIVE DIABETIC RETINOPATHY OF LEFT EYE WITH MACULAR EDEMA ASSOCIATED WITH TYPE 2 DIABETES MELLITUS: ICD-10-CM

## 2024-09-25 DIAGNOSIS — H25.811 COMBINED FORMS OF AGE-RELATED CATARACT OF RIGHT EYE: ICD-10-CM

## 2024-09-25 LAB
A LENGTH (OS): 25.63
AC IOL (OD): 3.16
AC IOL (OS): 3.35
WHITE TO WHITE (OD): 12.57 MM
WHITE TO WHITE (OS): 12.75 MM

## 2024-09-25 PROCEDURE — 3051F HG A1C>EQUAL 7.0%<8.0%: CPT | Performed by: OPHTHALMOLOGY

## 2024-09-25 PROCEDURE — 3048F LDL-C <100 MG/DL: CPT | Performed by: OPHTHALMOLOGY

## 2024-09-25 PROCEDURE — 3061F NEG MICROALBUMINURIA REV: CPT | Performed by: OPHTHALMOLOGY

## 2024-09-25 PROCEDURE — 4010F ACE/ARB THERAPY RXD/TAKEN: CPT | Performed by: OPHTHALMOLOGY

## 2024-09-25 PROCEDURE — 92136 OPHTHALMIC BIOMETRY: CPT | Mod: BILATERAL PROCEDURE | Performed by: OPHTHALMOLOGY

## 2024-09-25 PROCEDURE — 92134 CPTRZ OPH DX IMG PST SGM RTA: CPT | Performed by: OPHTHALMOLOGY

## 2024-09-25 PROCEDURE — 92136 OPHTHALMIC BIOMETRY: CPT | Performed by: OPHTHALMOLOGY

## 2024-09-25 PROCEDURE — 99214 OFFICE O/P EST MOD 30 MIN: CPT | Performed by: OPHTHALMOLOGY

## 2024-09-25 RX ORDER — MOXIFLOXACIN 5 MG/ML
1 SOLUTION/ DROPS OPHTHALMIC
OUTPATIENT
Start: 2024-09-25 | End: 2024-09-25

## 2024-09-25 RX ORDER — PHENYLEPHRINE HYDROCHLORIDE 25 MG/ML
1 SOLUTION/ DROPS OPHTHALMIC
OUTPATIENT
Start: 2024-09-25 | End: 2024-09-25

## 2024-09-25 RX ORDER — DICLOFENAC SODIUM 1 MG/ML
1 SOLUTION/ DROPS OPHTHALMIC ONCE
OUTPATIENT
Start: 2024-09-25 | End: 2024-09-25

## 2024-09-25 RX ORDER — TROPICAMIDE 10 MG/ML
1 SOLUTION/ DROPS OPHTHALMIC
OUTPATIENT
Start: 2024-09-25 | End: 2024-09-25

## 2024-09-25 RX ORDER — TETRACAINE HYDROCHLORIDE 5 MG/ML
1 SOLUTION OPHTHALMIC ONCE
OUTPATIENT
Start: 2024-09-25 | End: 2024-09-25

## 2024-09-25 RX ORDER — SODIUM CHLORIDE, SODIUM LACTATE, POTASSIUM CHLORIDE, CALCIUM CHLORIDE 600; 310; 30; 20 MG/100ML; MG/100ML; MG/100ML; MG/100ML
20 INJECTION, SOLUTION INTRAVENOUS CONTINUOUS
OUTPATIENT
Start: 2024-09-25

## 2024-09-25 RX ORDER — CYCLOPENTOLATE HYDROCHLORIDE 10 MG/ML
1 SOLUTION/ DROPS OPHTHALMIC
OUTPATIENT
Start: 2024-09-25 | End: 2024-09-25

## 2024-09-25 ASSESSMENT — VISUAL ACUITY
OD_CC: NLP
OS_PH_CC: 20/80
OS_CC: 20/100
METHOD: SNELLEN - LINEAR
CORRECTION_TYPE: GLASSES

## 2024-09-25 ASSESSMENT — REFRACTION_WEARINGRX
OD_ADD: +2.75
OS_CYLINDER: -0.75
OS_SPHERE: +1.25
OD_SPHERE: BALANCE
OS_AXIS: 095
OS_ADD: +2.75

## 2024-09-25 ASSESSMENT — ENCOUNTER SYMPTOMS: EYES NEGATIVE: 1

## 2024-09-25 ASSESSMENT — CUP TO DISC RATIO: OS_RATIO: 0.7

## 2024-09-25 ASSESSMENT — TONOMETRY
OD_IOP_MMHG: 58
OS_IOP_MMHG: 14-15
IOP_METHOD: GOLDMANN APPLANATION

## 2024-09-25 ASSESSMENT — SLIT LAMP EXAM - LIDS
COMMENTS: NORMAL
COMMENTS: NORMAL

## 2024-09-25 ASSESSMENT — PACHYMETRY
OS_CT(UM): 589
OD_CT(UM): 584

## 2024-09-25 ASSESSMENT — EXTERNAL EXAM - RIGHT EYE: OD_EXAM: NORMAL

## 2024-09-25 ASSESSMENT — EXTERNAL EXAM - LEFT EYE: OS_EXAM: NORMAL

## 2024-09-25 NOTE — PROGRESS NOTES
Visual Acuity (Snellen - Linear)         Right Left    Dist cc NLP 20/100    Dist ph cc  20/80      Correction: Glasses          Tonometry       Tonometry (Goldmann Applanation, 10:02 AM)         Right Left    Pressure 58 14-15                  Assessment/Plan   Last dilation:  4/17/23    1. 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  There is flow thru the Ahmed shunt OD; right eye is NLP and comfortable.  IOP remains controlled OS and acceptable given relatively healthy ON.  Pt is monocular. Latanoprost   --> no effect. IOP acceptable today, comfortable OD.  No evidence of progression      Plan:  cont combigan OU BID               cont travatan OS QHS               cont dorzolamide OS BID             2.  Cataracts OD>OS:  not visually significant OD because he is no light perception (NLP) from neovascular glaucoma (NVG) and PDR OD.  VA has quickly declined OS due to posterior subcapsular cataract (PSC) cataract.  Pt's VA improves c MRx, but is still only 20/60.  Given monocular status, will try MRx first, -> no effect.  Pt is having functional issues with all ADLs jono reading.  Discussed options 1) CPM, 2) cataract extraction with IOL + goniotomy.  Risks, benefits, and alternatives reviewed.  Risks including, but not limited to, death, loss of vision, increased or decreased eye pressure, pain, retinal detachment, suprachoroidal hemorrage, swelling in the cornea or retina/choroid, infection, double vision, need for further surgery, etc. As a result of cataract extraction, it is believed that the patient will experience improved vision.  Discussed IOL options 1) stnd, 2) toric, 3) multifocal/accomm. Pt with <1 D of astigmatism.  I do not feel that accommodating IOLs work well and multifocals can degrade contrast sensitivity.  Pt chooses standard IOL.        Plan:  pt wishes to proceed with cataract extraction with IOL + goniotomy OS (LEFT)    3.  Proliferative diabetic  retinopathy~E11.359, h/o Vitreous hemorrhage of left eye. s/p avastin injection with resolution of VH 8/17/17.      Plan:  as per Dr. Hodges

## 2024-09-27 ENCOUNTER — APPOINTMENT (OUTPATIENT)
Dept: OPHTHALMOLOGY | Facility: CLINIC | Age: 74
End: 2024-09-27
Payer: MEDICARE

## 2024-09-27 DIAGNOSIS — E11.3512 PROLIFERATIVE DIABETIC RETINOPATHY OF LEFT EYE WITH MACULAR EDEMA ASSOCIATED WITH TYPE 2 DIABETES MELLITUS: ICD-10-CM

## 2024-09-27 DIAGNOSIS — H40.1121 PRIMARY OPEN-ANGLE GLAUCOMA, LEFT EYE, MILD STAGE: ICD-10-CM

## 2024-09-27 DIAGNOSIS — H40.51X3 NEOVASCULAR GLAUCOMA OF RIGHT EYE, SEVERE STAGE: ICD-10-CM

## 2024-09-27 DIAGNOSIS — H25.812 COMBINED FORMS OF AGE-RELATED CATARACT OF LEFT EYE: Primary | ICD-10-CM

## 2024-09-27 PROCEDURE — 92134 CPTRZ OPH DX IMG PST SGM RTA: CPT | Performed by: OPHTHALMOLOGY

## 2024-09-27 PROCEDURE — 67028 INJECTION EYE DRUG: CPT | Mod: LEFT SIDE | Performed by: OPHTHALMOLOGY

## 2024-09-27 ASSESSMENT — ENCOUNTER SYMPTOMS
MUSCULOSKELETAL NEGATIVE: 0
NEUROLOGICAL NEGATIVE: 0
CONSTITUTIONAL NEGATIVE: 0
RESPIRATORY NEGATIVE: 0
EYES NEGATIVE: 0
PSYCHIATRIC NEGATIVE: 0
GASTROINTESTINAL NEGATIVE: 0
ENDOCRINE NEGATIVE: 0
CARDIOVASCULAR NEGATIVE: 0
ALLERGIC/IMMUNOLOGIC NEGATIVE: 0
HEMATOLOGIC/LYMPHATIC NEGATIVE: 0

## 2024-09-27 ASSESSMENT — CONF VISUAL FIELD
OD_INFERIOR_NASAL_RESTRICTION: 0
OS_SUPERIOR_TEMPORAL_RESTRICTION: 0
OS_NORMAL: 1
OD_SUPERIOR_TEMPORAL_RESTRICTION: 0
OD_SUPERIOR_NASAL_RESTRICTION: 0
METHOD: COUNTING FINGERS
OS_INFERIOR_NASAL_RESTRICTION: 0
OS_INFERIOR_TEMPORAL_RESTRICTION: 0
OD_NORMAL: 1
OD_INFERIOR_TEMPORAL_RESTRICTION: 0
OS_SUPERIOR_NASAL_RESTRICTION: 0

## 2024-09-27 ASSESSMENT — VISUAL ACUITY
METHOD: SNELLEN - LINEAR
OD_SC: NLP
OS_SC: 20/200

## 2024-09-27 ASSESSMENT — EXTERNAL EXAM - RIGHT EYE: OD_EXAM: NORMAL

## 2024-09-27 ASSESSMENT — TONOMETRY
OS_IOP_MMHG: 14
OD_IOP_MMHG: 46
IOP_METHOD: GOLDMANN APPLANATION

## 2024-09-27 ASSESSMENT — PACHYMETRY
OS_CT(UM): 589
OD_CT(UM): 584

## 2024-09-27 ASSESSMENT — SLIT LAMP EXAM - LIDS
COMMENTS: NORMAL
COMMENTS: NORMAL

## 2024-09-27 ASSESSMENT — EXTERNAL EXAM - LEFT EYE: OS_EXAM: NORMAL

## 2024-09-27 ASSESSMENT — CUP TO DISC RATIO: OS_RATIO: 0.7

## 2024-09-27 NOTE — PROGRESS NOTES
Assessment/Plan   Diagnoses and all orders for this visit:  Combined forms of age-related cataract of left eye  -     OCT, Retina - OU - Both Eyes  Proliferative diabetic retinopathy of left eye with macular edema associated with type 2 diabetes mellitus (Multi)  -     OCT, Retina - OU - Both Eyes  Primary open-angle glaucoma, left eye, mild stage  Neovascular glaucoma of right eye, severe stage           RTC 6 weeks   Impression    1 E11.3512 Controlled diabetes mellitus with left eye affected by proliferative retinopathy and macular edema-Improving  2 H40.1122 Primary open angle glaucoma of left eye, moderate stage-Stable  3 H40.51X3 Neovascular glaucoma of right eye, severe stage-Stable  4 H26.9 Bilateral cataracts-Stable  5 H43.12 Vitreous hemorrhage of left eye-Stable  6 H52.203 Astigmatism, bilateral-Stable  7 H52.4 Bilateral presbyopia-Stable    DIAGNOSTIC PROCEDURE DONE    OCT DONE OD/OS            REASON FOR TEST: will help address and tailor  therapy by detecting subclinical CME SRF     Hi quality OCT  scans obtained  signal good    OCT OD - no view  OCT OS - Normal Foveal Contour, mild  Edema, IS/OS Junction Normal    additional commnents:        Discussion    1. PDR OU. NVG OD vit hem os got treated with Eylea OS. today clear vitreous   f/u 3m. has PDR DME OS  E11.3511   sugery OS avoided now focus on DME OS DME vastly improved        The right eye was being treated to keep eye from being too painful but today no pain    no treatment today OD  needs treatment for left eye    however left eye he has DME  and vision has declined from 20/25 to 20/40  Exam and OCT reveal signifant edema today    NPDR DME mild E11.3212    Patient is approved for Vabysmo    Treatment options for DME OS discussed  Treatment options for DME OS discussed, including observation, anti-VEGF injections (including Avastin, Lucentis, Vabysmo and Eylea) and laser. Recommend anti-VEGF injections. Vabysmo done OS today in a sterile manner  with Betadine 5% for antisepsis.        2. 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  There is flow thru the BGI shunt OD; right eye is NLP and comfortable.  IOP is borderline controlled OS, but acceptable given rel healthy ON.  Pt is monocular. Latanoprost --> no effect       Follows with Dr. Servando guillermo OU BID            cont travatan OS QHS            f/u 3 months              3.  Cataracts OD>OS:  not significant OD and mild-moderately visually significant OS.                 Plan    RTC 6 weeks

## 2024-10-09 DIAGNOSIS — I10 BENIGN ESSENTIAL HYPERTENSION: ICD-10-CM

## 2024-10-10 ENCOUNTER — LAB (OUTPATIENT)
Dept: LAB | Facility: LAB | Age: 74
End: 2024-10-10
Payer: MEDICARE

## 2024-10-10 ENCOUNTER — APPOINTMENT (OUTPATIENT)
Dept: PRIMARY CARE | Facility: CLINIC | Age: 74
End: 2024-10-10
Payer: MEDICARE

## 2024-10-10 VITALS — BODY MASS INDEX: 28.23 KG/M2 | DIASTOLIC BLOOD PRESSURE: 76 MMHG | SYSTOLIC BLOOD PRESSURE: 123 MMHG | WEIGHT: 214 LBS

## 2024-10-10 DIAGNOSIS — Z00.00 HEALTH CARE MAINTENANCE: ICD-10-CM

## 2024-10-10 DIAGNOSIS — E78.2 MIXED HYPERLIPIDEMIA: ICD-10-CM

## 2024-10-10 DIAGNOSIS — I10 PRIMARY HYPERTENSION: ICD-10-CM

## 2024-10-10 DIAGNOSIS — E11.9 TYPE 2 DIABETES MELLITUS WITHOUT COMPLICATION, UNSPECIFIED WHETHER LONG TERM INSULIN USE (MULTI): Primary | ICD-10-CM

## 2024-10-10 DIAGNOSIS — E11.9 TYPE 2 DIABETES MELLITUS WITHOUT COMPLICATION, UNSPECIFIED WHETHER LONG TERM INSULIN USE (MULTI): ICD-10-CM

## 2024-10-10 LAB
CHOLEST SERPL-MCNC: 147 MG/DL (ref 0–199)
CHOLESTEROL/HDL RATIO: 2.9
EST. AVERAGE GLUCOSE BLD GHB EST-MCNC: 160 MG/DL
HBA1C MFR BLD: 7.2 %
HDLC SERPL-MCNC: 50.2 MG/DL
LDLC SERPL CALC-MCNC: 69 MG/DL
NON HDL CHOLESTEROL: 97 MG/DL (ref 0–149)
TRIGL SERPL-MCNC: 140 MG/DL (ref 0–149)
VLDL: 28 MG/DL (ref 0–40)

## 2024-10-10 PROCEDURE — 3051F HG A1C>EQUAL 7.0%<8.0%: CPT | Performed by: INTERNAL MEDICINE

## 2024-10-10 PROCEDURE — 3074F SYST BP LT 130 MM HG: CPT | Performed by: INTERNAL MEDICINE

## 2024-10-10 PROCEDURE — 3078F DIAST BP <80 MM HG: CPT | Performed by: INTERNAL MEDICINE

## 2024-10-10 PROCEDURE — 36415 COLL VENOUS BLD VENIPUNCTURE: CPT

## 2024-10-10 PROCEDURE — 4010F ACE/ARB THERAPY RXD/TAKEN: CPT | Performed by: INTERNAL MEDICINE

## 2024-10-10 PROCEDURE — 80061 LIPID PANEL: CPT

## 2024-10-10 PROCEDURE — 83036 HEMOGLOBIN GLYCOSYLATED A1C: CPT

## 2024-10-10 PROCEDURE — 3048F LDL-C <100 MG/DL: CPT | Performed by: INTERNAL MEDICINE

## 2024-10-10 PROCEDURE — 99213 OFFICE O/P EST LOW 20 MIN: CPT | Performed by: INTERNAL MEDICINE

## 2024-10-10 PROCEDURE — 3061F NEG MICROALBUMINURIA REV: CPT | Performed by: INTERNAL MEDICINE

## 2024-10-10 RX ORDER — BLOOD SUGAR DIAGNOSTIC
STRIP MISCELLANEOUS
Qty: 350 STRIP | Refills: 2 | Status: SHIPPED | OUTPATIENT
Start: 2024-10-10

## 2024-10-10 RX ORDER — AMLODIPINE BESYLATE 10 MG/1
10 TABLET ORAL DAILY
Qty: 90 TABLET | Refills: 0 | Status: SHIPPED | OUTPATIENT
Start: 2024-10-10
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
## 2024-10-14 DIAGNOSIS — H40.89 OTHER SPECIFIED GLAUCOMA: ICD-10-CM

## 2024-10-14 DIAGNOSIS — E11.9 TYPE 2 DIABETES MELLITUS WITHOUT COMPLICATION, UNSPECIFIED WHETHER LONG TERM INSULIN USE (MULTI): ICD-10-CM

## 2024-10-14 RX ORDER — INSULIN LISPRO 100 [IU]/ML
INJECTION, SOLUTION INTRAVENOUS; SUBCUTANEOUS
Qty: 15 ML | Refills: 4 | Status: SHIPPED | OUTPATIENT
Start: 2024-10-14

## 2024-10-14 RX ORDER — BRIMONIDINE TARTRATE AND TIMOLOL MALEATE 2; 5 MG/ML; MG/ML
1 SOLUTION OPHTHALMIC EVERY 12 HOURS SCHEDULED
Qty: 30 ML | Refills: 3 | Status: SHIPPED | OUTPATIENT
Start: 2024-10-14 | End: 2025-10-14

## 2024-11-08 DIAGNOSIS — H25.812 COMBINED FORMS OF AGE-RELATED CATARACT OF LEFT EYE: ICD-10-CM

## 2024-11-08 RX ORDER — PREDNISOLONE ACETATE 10 MG/ML
1 SUSPENSION/ DROPS OPHTHALMIC 4 TIMES DAILY
Qty: 5 ML | Refills: 1 | Status: SHIPPED | OUTPATIENT
Start: 2024-11-08 | End: 2024-12-08

## 2024-11-08 RX ORDER — OFLOXACIN 3 MG/ML
1 SOLUTION/ DROPS OPHTHALMIC 4 TIMES DAILY
Qty: 5 ML | Refills: 0 | Status: SHIPPED | OUTPATIENT
Start: 2024-11-08 | End: 2024-11-22

## 2024-11-08 RX ORDER — KETOROLAC TROMETHAMINE 4 MG/ML
1 SOLUTION/ DROPS OPHTHALMIC 4 TIMES DAILY
Qty: 7.5 ML | Refills: 0 | Status: SHIPPED | OUTPATIENT
Start: 2024-11-08 | End: 2024-11-22

## 2024-11-11 ENCOUNTER — ANESTHESIA EVENT (OUTPATIENT)
Dept: OPERATING ROOM | Facility: CLINIC | Age: 74
End: 2024-11-11
Payer: MEDICARE

## 2024-11-12 ENCOUNTER — ANESTHESIA (OUTPATIENT)
Dept: OPERATING ROOM | Facility: CLINIC | Age: 74
End: 2024-11-12
Payer: MEDICARE

## 2024-11-12 ENCOUNTER — HOSPITAL ENCOUNTER (OUTPATIENT)
Facility: CLINIC | Age: 74
Setting detail: OUTPATIENT SURGERY
Discharge: HOME | End: 2024-11-12
Attending: OPHTHALMOLOGY | Admitting: OPHTHALMOLOGY
Payer: MEDICARE

## 2024-11-12 VITALS
RESPIRATION RATE: 16 BRPM | HEART RATE: 55 BPM | DIASTOLIC BLOOD PRESSURE: 64 MMHG | OXYGEN SATURATION: 98 % | TEMPERATURE: 97.7 F | SYSTOLIC BLOOD PRESSURE: 113 MMHG

## 2024-11-12 DIAGNOSIS — H25.812 COMBINED FORMS OF AGE-RELATED CATARACT OF LEFT EYE: ICD-10-CM

## 2024-11-12 DIAGNOSIS — H25.812 COMBINED FORMS OF AGE-RELATED CATARACT OF LEFT EYE: Primary | ICD-10-CM

## 2024-11-12 DIAGNOSIS — H40.1121 PRIMARY OPEN-ANGLE GLAUCOMA, LEFT EYE, MILD STAGE: ICD-10-CM

## 2024-11-12 LAB — GLUCOSE BLD MANUAL STRIP-MCNC: 145 MG/DL (ref 74–99)

## 2024-11-12 PROCEDURE — 66984 XCAPSL CTRC RMVL W/O ECP: CPT | Performed by: OPHTHALMOLOGY

## 2024-11-12 PROCEDURE — 2500000005 HC RX 250 GENERAL PHARMACY W/O HCPCS: Performed by: OPHTHALMOLOGY

## 2024-11-12 PROCEDURE — 3700000002 HC GENERAL ANESTHESIA TIME - EACH INCREMENTAL 1 MINUTE: Performed by: OPHTHALMOLOGY

## 2024-11-12 PROCEDURE — 2500000004 HC RX 250 GENERAL PHARMACY W/ HCPCS (ALT 636 FOR OP/ED): Performed by: NURSE ANESTHETIST, CERTIFIED REGISTERED

## 2024-11-12 PROCEDURE — 7100000009 HC PHASE TWO TIME - INITIAL BASE CHARGE: Performed by: OPHTHALMOLOGY

## 2024-11-12 PROCEDURE — A65820 PR RELIEVE INNER EYE PRESSURE: Performed by: NURSE ANESTHETIST, CERTIFIED REGISTERED

## 2024-11-12 PROCEDURE — 82947 ASSAY GLUCOSE BLOOD QUANT: CPT

## 2024-11-12 PROCEDURE — 65820 GONIOTOMY: CPT | Performed by: OPHTHALMOLOGY

## 2024-11-12 PROCEDURE — 7100000010 HC PHASE TWO TIME - EACH INCREMENTAL 1 MINUTE: Performed by: OPHTHALMOLOGY

## 2024-11-12 PROCEDURE — 3600000003 HC OR TIME - INITIAL BASE CHARGE - PROCEDURE LEVEL THREE: Performed by: OPHTHALMOLOGY

## 2024-11-12 PROCEDURE — 2500000004 HC RX 250 GENERAL PHARMACY W/ HCPCS (ALT 636 FOR OP/ED): Performed by: ANESTHESIOLOGY

## 2024-11-12 PROCEDURE — 2500000004 HC RX 250 GENERAL PHARMACY W/ HCPCS (ALT 636 FOR OP/ED): Mod: JG | Performed by: OPHTHALMOLOGY

## 2024-11-12 PROCEDURE — 2500000004 HC RX 250 GENERAL PHARMACY W/ HCPCS (ALT 636 FOR OP/ED): Performed by: OPHTHALMOLOGY

## 2024-11-12 PROCEDURE — 2720000007 HC OR 272 NO HCPCS: Performed by: OPHTHALMOLOGY

## 2024-11-12 PROCEDURE — A65820 PR RELIEVE INNER EYE PRESSURE: Performed by: ANESTHESIOLOGY

## 2024-11-12 PROCEDURE — 3600000008 HC OR TIME - EACH INCREMENTAL 1 MINUTE - PROCEDURE LEVEL THREE: Performed by: OPHTHALMOLOGY

## 2024-11-12 PROCEDURE — C1780 LENS, INTRAOCULAR (NEW TECH): HCPCS | Performed by: OPHTHALMOLOGY

## 2024-11-12 PROCEDURE — 3700000001 HC GENERAL ANESTHESIA TIME - INITIAL BASE CHARGE: Performed by: OPHTHALMOLOGY

## 2024-11-12 PROCEDURE — 99100 ANES PT EXTEME AGE<1 YR&>70: CPT | Performed by: ANESTHESIOLOGY

## 2024-11-12 PROCEDURE — 96372 THER/PROPH/DIAG INJ SC/IM: CPT | Performed by: OPHTHALMOLOGY

## 2024-11-12 PROCEDURE — 2760000001 HC OR 276 NO HCPCS: Performed by: OPHTHALMOLOGY

## 2024-11-12 DEVICE — ACRYSOF(R) IQ ASPHERIC IOL SP ACRYLIC FOLDABLELENS WULTRASERT(TM) DELIVERY SYSTEM UV WBLUE LIGHT FILTER. 13.0MM LENGTH 6.0MM ANTERIORASYMMETRIC BICONVEX OPTIC PLANAR HAPTICS.
Type: IMPLANTABLE DEVICE | Site: EYE | Status: FUNCTIONAL
Brand: ACRYSOF ULTRASERT

## 2024-11-12 RX ORDER — WATER 1000 ML/1000ML
INJECTION, SOLUTION INTRAVENOUS CONTINUOUS PRN
Status: COMPLETED | OUTPATIENT
Start: 2024-11-12 | End: 2024-11-12

## 2024-11-12 RX ORDER — ONDANSETRON HYDROCHLORIDE 2 MG/ML
4 INJECTION, SOLUTION INTRAVENOUS ONCE AS NEEDED
Status: DISCONTINUED | OUTPATIENT
Start: 2024-11-12 | End: 2024-11-12 | Stop reason: HOSPADM

## 2024-11-12 RX ORDER — MIDAZOLAM HYDROCHLORIDE 1 MG/ML
INJECTION, SOLUTION INTRAMUSCULAR; INTRAVENOUS AS NEEDED
Status: DISCONTINUED | OUTPATIENT
Start: 2024-11-12 | End: 2024-11-12

## 2024-11-12 RX ORDER — CEFAZOLIN 1 G/1
INJECTION, POWDER, FOR SOLUTION INTRAVENOUS AS NEEDED
Status: DISCONTINUED | OUTPATIENT
Start: 2024-11-12 | End: 2024-11-12 | Stop reason: HOSPADM

## 2024-11-12 RX ORDER — TETRACAINE HYDROCHLORIDE 5 MG/ML
SOLUTION OPHTHALMIC AS NEEDED
Status: DISCONTINUED | OUTPATIENT
Start: 2024-11-12 | End: 2024-11-12 | Stop reason: HOSPADM

## 2024-11-12 RX ORDER — BUPIVACAINE HYDROCHLORIDE 7.5 MG/ML
INJECTION, SOLUTION EPIDURAL; RETROBULBAR AS NEEDED
Status: DISCONTINUED | OUTPATIENT
Start: 2024-11-12 | End: 2024-11-12 | Stop reason: HOSPADM

## 2024-11-12 RX ORDER — MOXIFLOXACIN 5 MG/ML
1 SOLUTION/ DROPS OPHTHALMIC
Status: COMPLETED | OUTPATIENT
Start: 2024-11-12 | End: 2024-11-12

## 2024-11-12 RX ORDER — OFLOXACIN 3 MG/ML
1 SOLUTION/ DROPS OPHTHALMIC 4 TIMES DAILY
Qty: 5 ML | Refills: 0 | Status: SHIPPED | OUTPATIENT
Start: 2024-11-12 | End: 2024-11-26

## 2024-11-12 RX ORDER — TROPICAMIDE 10 MG/ML
1 SOLUTION/ DROPS OPHTHALMIC
Status: COMPLETED | OUTPATIENT
Start: 2024-11-12 | End: 2024-11-12

## 2024-11-12 RX ORDER — TETRACAINE HYDROCHLORIDE 5 MG/ML
1 SOLUTION OPHTHALMIC ONCE
Status: COMPLETED | OUTPATIENT
Start: 2024-11-12 | End: 2024-11-12

## 2024-11-12 RX ORDER — DICLOFENAC SODIUM 1 MG/ML
1 SOLUTION/ DROPS OPHTHALMIC ONCE
Status: DISCONTINUED | OUTPATIENT
Start: 2024-11-12 | End: 2024-11-12 | Stop reason: HOSPADM

## 2024-11-12 RX ORDER — LIDOCAINE HYDROCHLORIDE 10 MG/ML
0.1 INJECTION, SOLUTION EPIDURAL; INFILTRATION; INTRACAUDAL; PERINEURAL ONCE
Status: DISCONTINUED | OUTPATIENT
Start: 2024-11-12 | End: 2024-11-12 | Stop reason: HOSPADM

## 2024-11-12 RX ORDER — FENTANYL CITRATE 50 UG/ML
INJECTION, SOLUTION INTRAMUSCULAR; INTRAVENOUS AS NEEDED
Status: DISCONTINUED | OUTPATIENT
Start: 2024-11-12 | End: 2024-11-12

## 2024-11-12 RX ORDER — WATER 1 ML/ML
IRRIGANT IRRIGATION AS NEEDED
Status: DISCONTINUED | OUTPATIENT
Start: 2024-11-12 | End: 2024-11-12 | Stop reason: HOSPADM

## 2024-11-12 RX ORDER — ACETAMINOPHEN 325 MG/1
975 TABLET ORAL ONCE
Status: DISCONTINUED | OUTPATIENT
Start: 2024-11-12 | End: 2024-11-12 | Stop reason: HOSPADM

## 2024-11-12 RX ORDER — LIDOCAINE HYDROCHLORIDE 20 MG/ML
INJECTION, SOLUTION INFILTRATION; PERINEURAL AS NEEDED
Status: DISCONTINUED | OUTPATIENT
Start: 2024-11-12 | End: 2024-11-12 | Stop reason: HOSPADM

## 2024-11-12 RX ORDER — CYCLOPENTOLATE HYDROCHLORIDE 10 MG/ML
1 SOLUTION/ DROPS OPHTHALMIC
Status: COMPLETED | OUTPATIENT
Start: 2024-11-12 | End: 2024-11-12

## 2024-11-12 RX ORDER — LIDOCAINE HYDROCHLORIDE 10 MG/ML
1 INJECTION, SOLUTION EPIDURAL; INFILTRATION; INTRACAUDAL; PERINEURAL ONCE
Status: COMPLETED | OUTPATIENT
Start: 2024-11-12 | End: 2024-11-12

## 2024-11-12 RX ORDER — DEXMEDETOMIDINE IN 0.9 % NACL 20 MCG/5ML
SYRINGE (ML) INTRAVENOUS AS NEEDED
Status: DISCONTINUED | OUTPATIENT
Start: 2024-11-12 | End: 2024-11-12

## 2024-11-12 RX ORDER — KETOROLAC TROMETHAMINE 4 MG/ML
1 SOLUTION/ DROPS OPHTHALMIC 4 TIMES DAILY
Qty: 5 ML | Refills: 0 | Status: SHIPPED | OUTPATIENT
Start: 2024-11-12 | End: 2024-11-26

## 2024-11-12 RX ORDER — SODIUM CHLORIDE, SODIUM LACTATE, POTASSIUM CHLORIDE, CALCIUM CHLORIDE 600; 310; 30; 20 MG/100ML; MG/100ML; MG/100ML; MG/100ML
20 INJECTION, SOLUTION INTRAVENOUS CONTINUOUS
Status: DISCONTINUED | OUTPATIENT
Start: 2024-11-12 | End: 2024-11-12 | Stop reason: HOSPADM

## 2024-11-12 RX ORDER — DEXAMETHASONE SODIUM PHOSPHATE 10 MG/ML
INJECTION INTRAMUSCULAR; INTRAVENOUS AS NEEDED
Status: DISCONTINUED | OUTPATIENT
Start: 2024-11-12 | End: 2024-11-12 | Stop reason: HOSPADM

## 2024-11-12 RX ORDER — PROPOFOL 10 MG/ML
INJECTION, EMULSION INTRAVENOUS AS NEEDED
Status: DISCONTINUED | OUTPATIENT
Start: 2024-11-12 | End: 2024-11-12

## 2024-11-12 RX ORDER — POVIDONE-IODINE 5 %
SOLUTION, NON-ORAL OPHTHALMIC (EYE) AS NEEDED
Status: DISCONTINUED | OUTPATIENT
Start: 2024-11-12 | End: 2024-11-12 | Stop reason: HOSPADM

## 2024-11-12 RX ORDER — PREDNISOLONE ACETATE 10 MG/ML
1 SUSPENSION/ DROPS OPHTHALMIC 4 TIMES DAILY
Qty: 5 ML | Refills: 1 | Status: SHIPPED | OUTPATIENT
Start: 2024-11-12 | End: 2024-12-12

## 2024-11-12 RX ORDER — EPINEPHRINE 1 MG/ML
INJECTION, SOLUTION, CONCENTRATE INTRAVENOUS AS NEEDED
Status: DISCONTINUED | OUTPATIENT
Start: 2024-11-12 | End: 2024-11-12 | Stop reason: HOSPADM

## 2024-11-12 RX ORDER — PHENYLEPHRINE HYDROCHLORIDE 25 MG/ML
1 SOLUTION/ DROPS OPHTHALMIC
Status: COMPLETED | OUTPATIENT
Start: 2024-11-12 | End: 2024-11-12

## 2024-11-12 SDOH — HEALTH STABILITY: MENTAL HEALTH: CURRENT SMOKER: 0

## 2024-11-12 ASSESSMENT — PAIN SCALES - GENERAL
PAINLEVEL_OUTOF10: 0 - NO PAIN
PAIN_LEVEL: 0
PAINLEVEL_OUTOF10: 0 - NO PAIN

## 2024-11-12 ASSESSMENT — COLUMBIA-SUICIDE SEVERITY RATING SCALE - C-SSRS
1. IN THE PAST MONTH, HAVE YOU WISHED YOU WERE DEAD OR WISHED YOU COULD GO TO SLEEP AND NOT WAKE UP?: NO
2. HAVE YOU ACTUALLY HAD ANY THOUGHTS OF KILLING YOURSELF?: NO
6. HAVE YOU EVER DONE ANYTHING, STARTED TO DO ANYTHING, OR PREPARED TO DO ANYTHING TO END YOUR LIFE?: NO

## 2024-11-12 ASSESSMENT — PAIN - FUNCTIONAL ASSESSMENT
PAIN_FUNCTIONAL_ASSESSMENT: 0-10
PAIN_FUNCTIONAL_ASSESSMENT: 0-10

## 2024-11-12 NOTE — DISCHARGE INSTRUCTIONS
Post-Op Instructions for Dr. Al's Cataract Patients   Dr. Al office number: [phone]  [phone]  After Hours: [phone], ask for eye doctor on call    Do not drive, or make any critical life decisions for 24 hours, give time for anesthetic to wear off.    1. Please keep that patch and shield on until 12:30PM today. It is ok to remove the patch and shield after that time. Please keep the shield; the shield is the hard plastic or metal cover that was on the outside.     2. Please expect that the vision in the operated eye will be blurry.     3. Please do not rub the operated eye.     4. When you plan to sleep, please cover your operated eye with the shield. You can use a piece of medical tape to keep the shield in place. Medical tape does not require a prescription and can be purchased over the counter wherever first aid items are purchased     5. Please do not lift items more than 10 Ibs and please limited bending over where your eye is below your heart.     6. It is ok to use your eyes - for example, watching TV, reading, etc., but as stated above, please expect that the newly operated eye will be blurry.     7. Please begin to use your post-operative eye drops in the newly operated eye     Ofloxacin (antibiotic) 3 times today     Prednisolone acetate (anti-inflammatory) 3 times today     Ketorolac (or Diclofenac) (anti-inflammatory) 3 times today

## 2024-11-12 NOTE — ANESTHESIA PREPROCEDURE EVALUATION
Patient: David Ko    Procedure Information       Date/Time: 11/12/24 1000    Procedures:       Extraction Cataract with Insertion Intraocular Lens (Left)      Goniotomy (Left)    [place] [facility] [place] [facility]    Surgeons: Harjinder Al MD            Relevant Problems   Anesthesia (within normal limits)      Cardiac   (+) Combined hyperlipidemia   (+) Hypertension      /Renal   (+) Chronic renal insufficiency      Endocrine   (+) Diabetic macular edema   (+) Diabetic retinopathy (Multi)   (+) Obesity, unspecified   (+) Proliferative diabetic retinopathy of left eye with macular edema associated with type 2 diabetes mellitus      HEENT   (+) Glaucoma associated with vascular disorders   (+) Neovascular glaucoma of right eye, severe stage   (+) Primary open angle glaucoma (POAG)   (+) Primary open-angle glaucoma, left eye, mild stage      ID   (+) Herpes simplex virus (HSV) infection       Clinical information reviewed:   Tobacco  Allergies  Meds   Med Hx  Surg Hx   Fam Hx  Soc Hx        NPO Detail:  NPO/Void Status  Carbohydrate Drink Given Prior to Surgery? : N  Date of Last Liquid: 11/12/24  Time of Last Liquid: 0700  Date of Last Solid: 11/11/24  Time of Last Solid: 1900  Last Intake Type: Clear fluids         Physical Exam    Airway  Mallampati: II  TM distance: >3 FB  Neck ROM: full     Cardiovascular - normal exam  Rate: normal     Dental   (+) lower dentures, upper dentures     Pulmonary - normal exam  Breath sounds clear to auscultation     Abdominal            Anesthesia Plan    History of general anesthesia?: yes  History of complications of general anesthesia?: no    ASA 3     MAC     The patient is not a current smoker.    intravenous induction   Anesthetic plan and risks discussed with patient.  Use of blood products discussed with patient who.    Plan discussed with CRNA.

## 2024-11-12 NOTE — H&P
History Of Present Illness  David Ko is a 74 y.o. male presenting with Cataract [H25.812] left eye and Primary open-angle glaucoma, left eye, mild stage [H40.1121] presenting for cataract extraction with intraocular lens implantation left eye and goniotomy left eye.     Past Medical History  Past Medical History:   Diagnosis Date    Cataract     CKD (chronic kidney disease)     Conjunctival hemorrhage, unspecified eye 06/01/2015    Subconjunctival hemorrhage    Essential (primary) hypertension 01/04/2014    Hypertension    Glaucoma secondary to other eye disorders, right eye, stage unspecified 02/07/2016    Neovascular glaucoma of right eye    Hyperlipidemia     Hypermetropia, left eye 12/19/2019    Hyperopia of left eye with astigmatism and presbyopia    Personal history of other endocrine, nutritional and metabolic disease     History of insulin dependent diabetes mellitus    Primary open-angle glaucoma, left eye, mild stage 12/20/2017    Primary open angle glaucoma of left eye, mild stage    Primary open-angle glaucoma, unspecified eye, stage unspecified 02/07/2016    POAG (primary open-angle glaucoma)    Type 2 diabetes mellitus with other diabetic ophthalmic complication 12/19/2019    Type 2 diabetes mellitus with vitreous hemorrhage of left eye    Type 2 diabetes mellitus with proliferative diabetic retinopathy with macular edema, left eye 12/09/2022    Controlled diabetes mellitus with left eye affected by proliferative retinopathy and macular edema    Type 2 diabetes mellitus with proliferative diabetic retinopathy without macular edema, unspecified eye 03/31/2017    Proliferative diabetic retinopathy     Current Outpatient Medications   Medication Instructions    amLODIPine (NORVASC) 10 mg, oral, Daily    aspirin 81 mg EC tablet Take 81 mg by mouth once daily. Indications: CEREBRAL THROMBOEMBOLISM PREVENTION    chlorthalidone (HYGROTON) 25 mg, oral, Daily    clotrimazole (Lotrimin) 1 % cream  "Clotrimazole 1 % External Cream   Quantity: 45  Refills: 0        Start : 20-Jul-2015   Active    Comfort EZ Pen Needles 31 gauge x 3/16\" needle USE 1 PEN NEEDLE DAILY    dorzolamide (Trusopt) 2 % ophthalmic solution 1 drop, ophthalmic (eye), 2 times daily    empagliflozin (JARDIANCE) 10 mg, oral, Daily    ergocalciferol (VITAMIN D-2) 50,000 Units, oral, Once Weekly    HumaLOG KwikPen Insulin 100 unit/mL injection INJECT 4 TO 8 UNITS UNDER THE SKIN THREE TIMES A DAY WITH MEALS    hydrALAZINE (APRESOLINE) 25 mg, 2 times daily    hydroCHLOROthiazide (HYDRODIURIL) 25 mg, oral, Daily    insulin detemir (Levemir U-100 Insulin) 100 unit/mL injection 4 units 3 times a day    insulin glargine (Lantus Solostar U-100 Insulin) 100 unit/mL (3 mL) pen INJECT 8 UNITS UNDER THE SKIN DAILY    insulin syringe-needle U-100 31G X 5/16\" 0.3 mL syringe USE TO INJECT DAILY AS DIRECTED    ketorolac (Acular) 0.4 % ophthalmic solution 1 drop, Left Eye, 4 times daily, INSTILL 1 DROP INTO SURGICAL EYE 4 TIMES A DAY STARTING DAY BEFORE SURGERY    latanoprost (Xalatan) 0.005 % ophthalmic solution Use 1 Drop in the left eye daily at bedtime.    lisinopril 40 mg, oral, Daily    metFORMIN XR (GLUCOPHAGE-XR) 500 mg, oral, Daily    metoprolol tartrate (Lopressor) 25 mg tablet Take 1 tablet (25 mg) by mouth once daily.    ofloxacin (Ocuflox) 0.3 % ophthalmic solution 1 drop, Left Eye, 4 times daily, INSTILL 1 DROP INTO SURGICAL EYE 4 TIMES A DAY STARTING DAY BEFORE SURGERY    OneTouch Delica Plus Lancet 33 gauge misc     OneTouch Ultra Control solution     OneTouch Ultra Test strip USE up to four TEST STRIPs PER DAY    prednisoLONE acetate (Pred-Forte) 1 % ophthalmic suspension 1 drop, Left Eye, 4 times daily, INSTILL 1 DROP INTO SURGICAL EYE 4 TIMES A DAY STARTING AFTER SURGERY    rosuvastatin (CRESTOR) 10 mg, Nightly    timolol (Timoptic) 0.5 % ophthalmic solution 1 drop, Both Eyes, Daily    travoprost (Travatan Z) 0.004 % drops ophthalmic solution " INSTILL 1 DROP INTO BOTH EYES AT BEDTIME        Surgical History  Past Surgical History:   Procedure Laterality Date    CATARACT EXTRACTION          Social History  He reports that he has never smoked. He has never used smokeless tobacco. He reports that he does not drink alcohol and does not use drugs.    Family History  Family History   Problem Relation Name Age of Onset    Cancer Sister      Diabetes Brother          Allergies  Patient has no known allergies.    Review of Systems  Patient denies ocular pain, redness, discharge, decreased vision, double vision, blind spots, flashes, or floaters.       Physical Exam  Constitutional: No acute distress   HEENT: mucus membranes moist, atraumatic   Respiratory: no respiratory distress   Cardiovascular: no peripheral edema  Abdomen: non distended   MSK/neuro: moves all extremities spontaneously   Skin: no rashes   Psych: alert and oriented       Last Recorded Vitals  There were no vitals taken for this visit.    Relevant Results  Results for orders placed or performed during the hospital encounter of 11/12/24 (from the past 24 hours)   POCT GLUCOSE   Result Value Ref Range    POCT Glucose 145 (H) 74 - 99 mg/dL         Assessment/Plan   Assessment & Plan  Primary open-angle glaucoma, left eye, mild stage    Cataract of both eyes      - Plan for cataract extraction with intraocular lens implantation left eye and goniotomy left eye           Valdemar Wetson MD

## 2024-11-12 NOTE — BRIEF OP NOTE
Date: 2024  OR Location: [facility] OR    Name: David Ko, : 1950, Age: 74 y.o., MRN: [id number], Sex: male    Diagnosis  Pre-op Diagnosis      * Combined forms of age-related cataract of left eye [H25.812]     * Primary open-angle glaucoma, left eye, mild stage [H40.1121] Post-op Diagnosis     * Combined forms of age-related cataract of left eye [H25.812]     * Primary open-angle glaucoma, left eye, mild stage [H40.1121]     Procedures  Extraction Cataract with Insertion Intraocular Lens  04186 - WI XCAPSL CTRC RMVL INSJ IO LENS PROSTH W/O ECP    Goniotomy  32818 - WI GONIOTOMY      Surgeons      * Harjinder Al - Primary    Resident/Fellow/Other Assistant:  Surgeons and Role:     * Valdemar Weston MD - Resident - Assisting    Staff:   Circulator: Claudia Bradley Person: Jocelin    Anesthesia Staff: Anesthesiologist: Radha Mcgee DO  CRNA: NIKI Fuller-CRNA    Procedure Summary  Anesthesia: Monitor Anesthesia Care  ASA: III  Estimated Blood Loss: <1mL  Intra-op Medications:   Administrations occurring from 1000 to 1115 on 24:   Medication Name Total Dose   balanced salts (BSS) intraocular solution 500 mL   ceFAZolin (Ancef) injection 50 mg   dexAMETHasone (Decadron) injection 5 mg   tobramycin-dexamethasone (Tobradex) ophthalmic ointment 0.5 inch   EPINEPHrine HCl (PF) (Adrenalin) injection 0.3 mg   sterile water injection 10 mL   sterile water irrigation solution 200 mL              Anesthesia Record               Intraprocedure I/O Totals       None           Specimen: No specimens collected       Findings: cataract, open angle    Complications:  None; patient tolerated the procedure well.     Disposition: PACU - hemodynamically stable.  Condition: stable  Specimens Collected: No specimens collected  Attending Attestation: I was present and scrubbed for the entire procedure.    Harjinder Al  Phone Number: [phone]

## 2024-11-12 NOTE — ANESTHESIA POSTPROCEDURE EVALUATION
Patient: David Ko    Procedure Summary       Date: 11/12/24 Room / Location: [facility] [place] [facility] OR 03 / [facility] [place] [facility] OR    Anesthesia Start: 0942 Anesthesia Stop: 1024    Procedures:       Extraction Cataract with Insertion Intraocular Lens (Left)      Goniotomy (Left) Diagnosis:       Combined forms of age-related cataract of left eye      Primary open-angle glaucoma, left eye, mild stage      (Combined forms of age-related cataract of left eye [H25.812])      (Primary open-angle glaucoma, left eye, mild stage [H40.1121])    Surgeons: Harjinder Al MD Responsible Provider: Radha Mcgee DO    Anesthesia Type: MAC ASA Status: 3            Anesthesia Type: MAC    Vitals Value Taken Time   /65 11/12/24 1023   Temp 36.4 °C (97.5 °F) 11/12/24 1023   Pulse 63 11/12/24 1023   Resp 16 11/12/24 1023   SpO2 99 % 11/12/24 1023       Anesthesia Post Evaluation    Patient location during evaluation: PACU  Patient participation: complete - patient participated  Level of consciousness: awake  Pain score: 0  Pain management: adequate  Airway patency: patent  Cardiovascular status: acceptable  Respiratory status: acceptable  Hydration status: acceptable  Postoperative Nausea and Vomiting: none        No notable events documented.

## 2024-11-12 NOTE — OP NOTE
Extraction Cataract with Insertion Intraocular Lens (L), Goniotomy (L) Operative Note     Date: 2024  OR Location: [facility] [place] [facility] OR    Name: David Ko, : 1950, Age: 74 y.o., MRN: [id number], Sex: male    Diagnosis  Pre-op Diagnosis      * Combined forms of age-related cataract of left eye [H25.812]     * Primary open-angle glaucoma, left eye, mild stage [H40.1121] Post-op Diagnosis     * Combined forms of age-related cataract of left eye [H25.812]     * Primary open-angle glaucoma, left eye, mild stage [H40.1121]     Procedures  Extraction Cataract with Insertion Intraocular Lens  10015 - MO XCAPSL CTRC RMVL INSJ IO LENS PROSTH W/O ECP    Goniotomy  69335 - MO GONIOTOMY      Surgeons      * Harjinder Al - Primary    Resident/Fellow/Other Assistant:  Surgeons and Role:     * Valdemar Wetson MD - Resident - Assisting    Staff:   Mellissaulator: Claudia Bradley Person: Jocelin    Anesthesia Staff: Anesthesiologist: Radha Mcgee DO  CRNA: NIKI Fuller-CRNA    Procedure Summary  Anesthesia: Monitor Anesthesia Care  ASA: III  Estimated Blood Loss: <1mL  Intra-op Medications:   Administrations occurring from 1000 to 1115 on 24:   Medication Name Total Dose   balanced salts (BSS) intraocular solution 500 mL   ceFAZolin (Ancef) injection 50 mg   dexAMETHasone (Decadron) injection 5 mg   tobramycin-dexamethasone (Tobradex) ophthalmic ointment 0.5 inch   EPINEPHrine HCl (PF) (Adrenalin) injection 0.3 mg   sterile water injection 10 mL   sterile water irrigation solution 200 mL              Anesthesia Record               Intraprocedure I/O Totals       None           Specimen: No specimens collected       Drains and/or Catheters: * None in log *    Tourniquet Times:         Implants:  Implants       Type Name Action Serial No.      Lens LENS, INTRAOCULAR, AU00T0 19.5D - [id number] Implanted [id number]              Findings: cataract, open angle    Indications: David Ko is an  74 y.o. male who is having surgery for Combined forms of age-related cataract of left eye [H25.812]  Primary open-angle glaucoma, left eye, mild stage [H40.1121].     The patient was seen in the preoperative area. The risks, benefits, complications, treatment options, non-operative alternatives, expected recovery and outcomes were discussed with the patient. The possibilities of reaction to medication, pulmonary aspiration, injury to surrounding structures, bleeding, recurrent infection, the need for additional procedures, failure to diagnose a condition, and creating a complication requiring transfusion or operation were discussed with the patient. The patient concurred with the proposed plan, giving informed consent.  The site of surgery was properly noted/marked if necessary per policy. The patient has been actively warmed in preoperative area. Preoperative antibiotics are not indicated. Venous thrombosis prophylaxis are not indicated.    Procedure Details:   Patient was identified using two unique identifiers in the preoperative area and the operative side was marked on the forehead using a marking pen after the patient stated procedure and laterality which was confirmed by reviewing the informed consent form. The patient was then taken to the operative suite where joe-bulbar anesthesia was administered consisting of 2% lidocaine and 0.75% Marcaine. The area was then prepped and draped in the standard sterile fashion for intraocular surgery of the LEFT eye.     The patient and microscope were turned to provide adequate visualization of the iridocorneal angle. Viscoelastic was placed on the corneal surface and then a trabectome direct gonioscopy lens was placed on the cornea. A 15 degree supersharp blade was used to enter the anterior chamber to create a paracentesis side port incision. A 2.65 mm keratome was used to create a beveled temporal clear corneal incision. A [place] Dual Blade was passed across the  anterior chamber through the temporal incision to the nasal trabecular meshwork and approximately 90 degree nasal goniotomy was performed successfully. Attention was then paid to cataract extraction.      A dispersive viscoelastic was used to maintain the anterior chamber. A curvilinear continuous capsulorhexis was initiated using a bent cystitome needle and completed using utrata forceps. Hydrodissection and hydrodelineation were performed. Phacoemulsification was used to remove all nuclear material. Irrigation/aspiration was used to remove all cortical material. CDE 23.17. The anterior chamber was refilled using a cohesive viscoelastic.  An AU00T0 +19.5D lens (SN [id number] EXP 2025-08-28) was inserted into the capsular bag using the injector system and the trailing haptic was gently positioned within the capsular bag using a sinsky hook. Irrigation/aspiration was used remove all viscoelastic material.  The anterior chamber was refilled using BSS and all wounds were stromally hydrated and found to be water tight by dry weck cell testing. The pressure was appropriate by applanation. Subconjunctival injections of Ancef and Decadron were given. Tobradex ointment, sterile patch and shield were then placed over the eye, and the patient was taken to recovery having tolerated the procedure well.        Complications:  None; patient tolerated the procedure well.    Disposition: PACU - hemodynamically stable.  Condition: stable       Additional Details: Patient will see Dr. Al tomorrow.    Attending Attestation: I was present and scrubbed for the entire procedure.    Harjinder Al  Phone Number: [phone]

## 2024-11-13 ENCOUNTER — APPOINTMENT (OUTPATIENT)
Dept: OPHTHALMOLOGY | Facility: CLINIC | Age: 74
End: 2024-11-13
Payer: MEDICARE

## 2024-11-13 DIAGNOSIS — Z96.1 PSEUDOPHAKIA: Primary | ICD-10-CM

## 2024-11-13 PROCEDURE — 3051F HG A1C>EQUAL 7.0%<8.0%: CPT | Performed by: OPHTHALMOLOGY

## 2024-11-13 PROCEDURE — 99024 POSTOP FOLLOW-UP VISIT: CPT | Performed by: OPHTHALMOLOGY

## 2024-11-13 PROCEDURE — 3048F LDL-C <100 MG/DL: CPT | Performed by: OPHTHALMOLOGY

## 2024-11-13 PROCEDURE — 3061F NEG MICROALBUMINURIA REV: CPT | Performed by: OPHTHALMOLOGY

## 2024-11-13 PROCEDURE — 4010F ACE/ARB THERAPY RXD/TAKEN: CPT | Performed by: OPHTHALMOLOGY

## 2024-11-13 ASSESSMENT — PACHYMETRY
OS_CT(UM): 589
OD_CT(UM): 584

## 2024-11-13 ASSESSMENT — VISUAL ACUITY
OS_SC: 20/60
OS_SC+: -1
OD_SC: NLP
METHOD: SNELLEN - LINEAR

## 2024-11-13 ASSESSMENT — TONOMETRY
IOP_METHOD: GOLDMANN APPLANATION
OS_IOP_MMHG: 17

## 2024-11-13 ASSESSMENT — SLIT LAMP EXAM - LIDS
COMMENTS: NORMAL
COMMENTS: NORMAL

## 2024-11-13 ASSESSMENT — CUP TO DISC RATIO: OS_RATIO: 0.7

## 2024-11-13 ASSESSMENT — EXTERNAL EXAM - RIGHT EYE: OD_EXAM: NORMAL

## 2024-11-13 ASSESSMENT — EXTERNAL EXAM - LEFT EYE: OS_EXAM: NORMAL

## 2024-11-13 NOTE — PROGRESS NOTES
Visual Acuity (Snellen - Linear)         Right Left    Dist sc NLP 20/60 -1    Dist ph sc  NI          Tonometry       Tonometry (Goldmann Applanation, 10:42 AM)         Right Left    Pressure  17                  Assessment/Plan   Last dilation:  4/17/23    1. POD#1 s/p combined c KDB goniotomy OS 11/12/24:  pre-op VA 20/200 and IOP = 14 mmHg.  Doing well.  Pt with some fluid/viscoelastic between the IOL and posterior capsule, but there is significant superficial punctate keratitis (SPK) obscuring the view in.      Plan:  increase prednisolone acetate 1% OS TID -> QID            increase ofloxacin OS TID -> QID            increase ketorolac OS TID -> QID            cont activity restrictions            glaucoma drops as below            written instructions given            f/u 1 week     2.  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  There is flow thru the Ahmed shunt OD; right eye is NLP and comfortable.  IOP remains controlled OS and acceptable given relatively healthy ON.  Pt is monocular. Latanoprost   --> no effect. IOP acceptable today, comfortable OD.  No evidence of progression      Plan:  cont combigan OU BID               hold travatan OS               hold dorzolamide OS             2.  Cataracts OD / PCIOL OS:  not visually significant OD because he is no light perception (NLP) from neovascular glaucoma (NVG) and PDR OD.      Plan:  as above    3.  Proliferative diabetic retinopathy~E11.359, h/o Vitreous hemorrhage of left eye. s/p avastin injection with resolution of VH 8/17/17.      Plan:  as per Dr. Hodges

## 2024-11-20 ENCOUNTER — APPOINTMENT (OUTPATIENT)
Dept: OPHTHALMOLOGY | Facility: CLINIC | Age: 74
End: 2024-11-20
Payer: MEDICARE

## 2024-11-20 DIAGNOSIS — Z96.1 PSEUDOPHAKIA: Primary | ICD-10-CM

## 2024-11-20 DIAGNOSIS — H40.51X3 NEOVASCULAR GLAUCOMA OF RIGHT EYE, SEVERE STAGE: ICD-10-CM

## 2024-11-20 DIAGNOSIS — H40.89 OTHER SPECIFIED GLAUCOMA: ICD-10-CM

## 2024-11-20 DIAGNOSIS — H40.1122 PRIMARY OPEN ANGLE GLAUCOMA OF LEFT EYE, MODERATE STAGE: ICD-10-CM

## 2024-11-20 PROCEDURE — 1159F MED LIST DOCD IN RCRD: CPT | Performed by: OPHTHALMOLOGY

## 2024-11-20 PROCEDURE — 4010F ACE/ARB THERAPY RXD/TAKEN: CPT | Performed by: OPHTHALMOLOGY

## 2024-11-20 PROCEDURE — 99024 POSTOP FOLLOW-UP VISIT: CPT | Performed by: OPHTHALMOLOGY

## 2024-11-20 PROCEDURE — 1036F TOBACCO NON-USER: CPT | Performed by: OPHTHALMOLOGY

## 2024-11-20 PROCEDURE — 3051F HG A1C>EQUAL 7.0%<8.0%: CPT | Performed by: OPHTHALMOLOGY

## 2024-11-20 PROCEDURE — 3048F LDL-C <100 MG/DL: CPT | Performed by: OPHTHALMOLOGY

## 2024-11-20 PROCEDURE — 3061F NEG MICROALBUMINURIA REV: CPT | Performed by: OPHTHALMOLOGY

## 2024-11-20 RX ORDER — LANCETS 26 GAUGE
EACH MISCELLANEOUS
COMMUNITY

## 2024-11-20 RX ORDER — TRAVOPROST OPHTHALMIC SOLUTION 0.04 MG/ML
SOLUTION OPHTHALMIC
Qty: 10 ML | Refills: 2 | Status: SHIPPED | OUTPATIENT
Start: 2024-11-20

## 2024-11-20 RX ORDER — ISOPROPYL ALCOHOL 70 ML/100ML
SWAB TOPICAL
COMMUNITY

## 2024-11-20 RX ORDER — BRIMONIDINE TARTRATE AND TIMOLOL MALEATE 2; 5 MG/ML; MG/ML
1 SOLUTION OPHTHALMIC 2 TIMES DAILY
Qty: 30 ML | Refills: 3 | Status: SHIPPED | OUTPATIENT
Start: 2024-11-20

## 2024-11-20 ASSESSMENT — PACHYMETRY
OS_CT(UM): 589
OD_CT(UM): 584

## 2024-11-20 ASSESSMENT — TONOMETRY
IOP_METHOD: GOLDMANN APPLANATION
OD_IOP_MMHG: 46
OS_IOP_MMHG: 17-18

## 2024-11-20 ASSESSMENT — SLIT LAMP EXAM - LIDS
COMMENTS: NORMAL
COMMENTS: NORMAL

## 2024-11-20 ASSESSMENT — VISUAL ACUITY
METHOD: SNELLEN - LINEAR
OS_SC: 20/30
OD_SC: NLP

## 2024-11-20 ASSESSMENT — CONF VISUAL FIELD
OD_INFERIOR_TEMPORAL_RESTRICTION: 1
OD_INFERIOR_NASAL_RESTRICTION: 1
OD_SUPERIOR_TEMPORAL_RESTRICTION: 1
OD_SUPERIOR_NASAL_RESTRICTION: 1

## 2024-11-20 ASSESSMENT — EXTERNAL EXAM - RIGHT EYE: OD_EXAM: NORMAL

## 2024-11-20 ASSESSMENT — ENCOUNTER SYMPTOMS: EYES NEGATIVE: 1

## 2024-11-20 ASSESSMENT — EXTERNAL EXAM - LEFT EYE: OS_EXAM: NORMAL

## 2024-11-20 ASSESSMENT — CUP TO DISC RATIO: OS_RATIO: 0.7

## 2024-11-20 NOTE — PROGRESS NOTES
Visual Acuity (Snellen - Linear)         Right Left    Dist sc NLP 20/30          Tonometry       Tonometry (Goldmann Applanation, 10:16 AM)         Right Left    Pressure 46 17-18                  Assessment/Plan   Last dilation:  8/19/24    1. POD#8 s/p combined c KDB goniotomy OS 11/12/24:  pre-op VA 20/200 and IOP = 14 mmHg.  Doing well.  Pt with some fluid/viscoelastic between the IOL and posterior capsule, but there is significant superficial punctate keratitis (SPK) obscuring the view in.      Plan:  taper prednisolone acetate 1% OS QID -> BID x 1 wk, then QD x 1 wk, then stop            d/c ofloxacin             d/c ketorolac            d/c activity restrictions            glaucoma drops as below            written instructions given            f/u 1 month (MRx)    2.  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  There is flow thru the Ahmed shunt OD; right eye is NLP and comfortable.  IOP remains controlled OS and acceptable given relatively healthy ON.  Pt is monocular. Latanoprost   --> no effect. IOP acceptable today, comfortable OD.  No evidence of progression      Plan:  cont combigan OU BID               re-start travatan OS               hold dorzolamide OS             2.  Cataracts OD / PCIOL OS:  not visually significant OD because he is no light perception (NLP) from neovascular glaucoma (NVG) and PDR OD.      Plan:  as above    3.  Proliferative diabetic retinopathy~E11.359, h/o Vitreous hemorrhage of left eye. s/p avastin injection with resolution of VH 8/17/17.      Plan:  as per Dr. Hodges

## 2024-12-03 ENCOUNTER — LAB (OUTPATIENT)
Dept: LAB | Facility: LAB | Age: 74
End: 2024-12-03
Payer: MEDICARE

## 2024-12-03 DIAGNOSIS — N18.32 CHRONIC KIDNEY DISEASE, STAGE 3B (MULTI): ICD-10-CM

## 2024-12-03 DIAGNOSIS — N18.32 CHRONIC KIDNEY DISEASE, STAGE 3B (MULTI): Primary | ICD-10-CM

## 2024-12-03 LAB
25(OH)D3 SERPL-MCNC: 28 NG/ML (ref 30–100)
ALBUMIN SERPL BCP-MCNC: 4.2 G/DL (ref 3.4–5)
ANION GAP SERPL CALC-SCNC: 10 MMOL/L (ref 10–20)
BUN SERPL-MCNC: 35 MG/DL (ref 6–23)
CALCIUM SERPL-MCNC: 9.7 MG/DL (ref 8.6–10.3)
CHLORIDE SERPL-SCNC: 110 MMOL/L (ref 98–107)
CO2 SERPL-SCNC: 29 MMOL/L (ref 21–32)
CREAT SERPL-MCNC: 2.33 MG/DL (ref 0.5–1.3)
CREAT UR-MCNC: 98 MG/DL (ref 20–370)
EGFRCR SERPLBLD CKD-EPI 2021: 29 ML/MIN/1.73M*2
GLUCOSE SERPL-MCNC: 71 MG/DL (ref 74–99)
MICROALBUMIN UR-MCNC: 25.3 MG/L
MICROALBUMIN/CREAT UR: 25.8 UG/MG CREAT
PHOSPHATE SERPL-MCNC: 3.5 MG/DL (ref 2.5–4.9)
POTASSIUM SERPL-SCNC: 5 MMOL/L (ref 3.5–5.3)
PTH-INTACT SERPL-MCNC: 128.9 PG/ML (ref 18.5–88)
SODIUM SERPL-SCNC: 144 MMOL/L (ref 136–145)

## 2024-12-03 PROCEDURE — 80069 RENAL FUNCTION PANEL: CPT

## 2024-12-03 PROCEDURE — 82570 ASSAY OF URINE CREATININE: CPT

## 2024-12-03 PROCEDURE — 83970 ASSAY OF PARATHORMONE: CPT

## 2024-12-03 PROCEDURE — 82043 UR ALBUMIN QUANTITATIVE: CPT

## 2024-12-03 PROCEDURE — 82306 VITAMIN D 25 HYDROXY: CPT

## 2024-12-03 PROCEDURE — 36415 COLL VENOUS BLD VENIPUNCTURE: CPT

## 2024-12-05 ENCOUNTER — APPOINTMENT (OUTPATIENT)
Dept: NEPHROLOGY | Facility: CLINIC | Age: 74
End: 2024-12-05
Payer: MEDICARE

## 2024-12-05 VITALS
BODY MASS INDEX: 28.49 KG/M2 | HEIGHT: 73 IN | WEIGHT: 215 LBS | SYSTOLIC BLOOD PRESSURE: 157 MMHG | DIASTOLIC BLOOD PRESSURE: 82 MMHG | HEART RATE: 69 BPM

## 2024-12-05 DIAGNOSIS — N18.32 CHRONIC KIDNEY DISEASE, STAGE 3B (MULTI): Primary | ICD-10-CM

## 2024-12-05 PROCEDURE — 99213 OFFICE O/P EST LOW 20 MIN: CPT | Performed by: INTERNAL MEDICINE

## 2024-12-05 PROCEDURE — 3008F BODY MASS INDEX DOCD: CPT | Performed by: INTERNAL MEDICINE

## 2024-12-05 PROCEDURE — 3061F NEG MICROALBUMINURIA REV: CPT | Performed by: INTERNAL MEDICINE

## 2024-12-05 PROCEDURE — 3048F LDL-C <100 MG/DL: CPT | Performed by: INTERNAL MEDICINE

## 2024-12-05 PROCEDURE — 3079F DIAST BP 80-89 MM HG: CPT | Performed by: INTERNAL MEDICINE

## 2024-12-05 PROCEDURE — 1126F AMNT PAIN NOTED NONE PRSNT: CPT | Performed by: INTERNAL MEDICINE

## 2024-12-05 PROCEDURE — 3077F SYST BP >= 140 MM HG: CPT | Performed by: INTERNAL MEDICINE

## 2024-12-05 PROCEDURE — 3051F HG A1C>EQUAL 7.0%<8.0%: CPT | Performed by: INTERNAL MEDICINE

## 2024-12-05 PROCEDURE — 1159F MED LIST DOCD IN RCRD: CPT | Performed by: INTERNAL MEDICINE

## 2024-12-05 PROCEDURE — 4010F ACE/ARB THERAPY RXD/TAKEN: CPT | Performed by: INTERNAL MEDICINE

## 2024-12-05 ASSESSMENT — PAIN SCALES - GENERAL: PAINLEVEL_OUTOF10: 0-NO PAIN

## 2024-12-09 NOTE — PROGRESS NOTES
This is a 74-year-old gentleman with a history of CKD stage III.  Had creatinine of around 2 in October 2023.  He reports not using NSAIDs and is checking blood pressure at home with a range of 130-140 systolic blood pressure.  He has not had any nausea, fatigue, metallic taste.  He is taking his medications as prescribed and has been taking Jardiance and is taking Hydrochlorothiazide and not chlorthalidone at present.    Otherwise no shortness of breath.  He does report leg swelling bilaterally.  No orthopnea    Past Medical History:   Diagnosis Date    Cataract     CKD (chronic kidney disease)     Conjunctival hemorrhage, unspecified eye 06/01/2015    Subconjunctival hemorrhage    Essential (primary) hypertension 01/04/2014    Hypertension    Glaucoma secondary to other eye disorders, right eye, stage unspecified 02/07/2016    Neovascular glaucoma of right eye    Hyperlipidemia     Hypermetropia, left eye 12/19/2019    Hyperopia of left eye with astigmatism and presbyopia    Personal history of other endocrine, nutritional and metabolic disease     History of insulin dependent diabetes mellitus    Primary open-angle glaucoma, left eye, mild stage 12/20/2017    Primary open angle glaucoma of left eye, mild stage    Primary open-angle glaucoma, unspecified eye, stage unspecified 02/07/2016    POAG (primary open-angle glaucoma)    Type 2 diabetes mellitus with other diabetic ophthalmic complication 12/19/2019    Type 2 diabetes mellitus with vitreous hemorrhage of left eye    Type 2 diabetes mellitus with proliferative diabetic retinopathy with macular edema, left eye 12/09/2022    Controlled diabetes mellitus with left eye affected by proliferative retinopathy and macular edema    Type 2 diabetes mellitus with proliferative diabetic retinopathy without macular edema, unspecified eye 03/31/2017    Proliferative diabetic retinopathy     Current Outpatient Medications on File Prior to Visit   Medication Sig Dispense  "Refill    amLODIPine (Norvasc) 10 mg tablet TAKE 1 TABLET(10 MG) BY MOUTH EVERY DAY 90 tablet 0    aspirin 81 mg EC tablet Take 81 mg by mouth once daily. Indications: CEREBRAL THROMBOEMBOLISM PREVENTION      Autolet (OneTouch Delica Plus Lanc Dev) lancing device       chlorthalidone (Hygroton) 25 mg tablet TAKE 1 TABLET(25 MG) BY MOUTH EVERY DAY 90 tablet 1    clotrimazole (Lotrimin) 1 % cream Clotrimazole 1 % External Cream   Quantity: 45  Refills: 0        Start : 20-Jul-2015   Active      Comfort EZ Pen Needles 31 gauge x 3/16\" needle USE 1 PEN NEEDLE DAILY 100 each 3    empagliflozin (Jardiance) 10 mg Take 1 tablet (10 mg) by mouth once daily. 90 tablet 3    ergocalciferol (Vitamin D-2) 1.25 MG (46922 UT) capsule Take 1 capsule (50,000 Units) by mouth 1 (one) time per week. 12 capsule 0    HumaLOG KwikPen Insulin 100 unit/mL injection INJECT 4 TO 8 UNITS UNDER THE SKIN THREE TIMES A DAY WITH MEALS 15 mL 4    hydrALAZINE (Apresoline) 25 mg tablet Take 1 tablet (25 mg) by mouth 2 times a day.      hydroCHLOROthiazide (HYDRODiuril) 25 mg tablet Take 1 tablet (25 mg) by mouth once daily. 90 tablet 3    insulin detemir (Levemir U-100 Insulin) 100 unit/mL injection 4 units 3 times a day      insulin glargine (Lantus Solostar U-100 Insulin) 100 unit/mL (3 mL) pen INJECT 8 UNITS UNDER THE SKIN DAILY 15 mL 0    insulin syringe-needle U-100 31G X 5/16\" 0.3 mL syringe USE TO INJECT DAILY AS DIRECTED      lisinopril 40 mg tablet Take 1 tablet (40 mg) by mouth once daily. 90 tablet 3    metFORMIN XR (Glucophage-XR) 500 mg 24 hr tablet Take 1 tablet (500 mg) by mouth once daily. 90 tablet 0    metoprolol tartrate (Lopressor) 25 mg tablet Take 1 tablet (25 mg) by mouth once daily.      OneTouch Delica Plus Lancet 33 gauge misc       OneTouch Ultra Control solution       OneTouch Ultra Test strip USE up to four TEST STRIPs PER  strip 2    prednisoLONE acetate (Pred-Forte) 1 % ophthalmic suspension Administer 1 drop into " the left eye 4 times a day. INSTILL 1 DROP INTO SURGICAL EYE 4 TIMES A DAY STARTING AFTER SURGERY 5 mL 1    rosuvastatin (Crestor) 10 mg tablet Take 1 tablet (10 mg) by mouth once daily at bedtime.      travoprost (Travatan Z) 0.004 % drops ophthalmic solution INSTILL 1 DROP INTO BOTH EYES AT BEDTIME 10 mL 2    alcohol swabs (Alcohol Prep Pads) pads, medicated       brimonidine-timoloL (Combigan) 0.2-0.5 % ophthalmic solution Administer 1 drop into both eyes 2 times a day. (Patient not taking: Reported on 12/5/2024) 30 mL 3     No current facility-administered medications on file prior to visit.         Vitals:    12/05/24 1446   BP: 157/82   Pulse: 69     Physical exam:  Gen: NAD  CVS: RRR  Resp: CTABL  Ext: 1+ Edema B/l    Lab Results   Component Value Date    GLUCOSE 71 (L) 12/03/2024    CALCIUM 9.7 12/03/2024     12/03/2024    K 5.0 12/03/2024    CO2 29 12/03/2024     (H) 12/03/2024    BUN 35 (H) 12/03/2024    CREATININE 2.33 (H) 12/03/2024     Assessment and Plan:   #CKD Stage IIIb/A1  Creatinine previously baseline 2.2 in 2023 that trended up to 2.6 in summer / July. Has trended back down to 2.3 on last check on 12/3. No hospitalizations since that time other than for cataract surgery. He is taking SGTL2 as prescribed and avoiding NSAIDs. Blood pressure appears suboptimally controlled based on office readings but his home readings appear more controlled. Is to repeat labs and urine testing before next visit for surveillance.   #HTN  BP at office today 157/82. He is stating that his home readings are better controlled at around 130 systolic. We advised him to bring his home BP monitor to any clinic to verify its accuracy. Will not make any adjustments at this time. No changes to current regimen.    RTO in 4 months

## 2024-12-18 ENCOUNTER — APPOINTMENT (OUTPATIENT)
Dept: OPHTHALMOLOGY | Facility: CLINIC | Age: 74
End: 2024-12-18
Payer: MEDICARE

## 2024-12-18 DIAGNOSIS — Z96.1 PSEUDOPHAKIA: Primary | ICD-10-CM

## 2024-12-18 PROCEDURE — 99024 POSTOP FOLLOW-UP VISIT: CPT | Performed by: OPHTHALMOLOGY

## 2024-12-18 ASSESSMENT — REFRACTION_MANIFEST
OS_ADD: +2.75
OS_AXIS: 085
OS_SPHERE: +0.50
METHOD_AUTOREFRACTION: 1
OS_CYLINDER: -1.25
OS_SPHERE: +0.50
OD_SPHERE: BALANCE
OS_CYLINDER: -1.25
OS_AXIS: 085

## 2024-12-18 ASSESSMENT — PACHYMETRY
OD_CT(UM): 584
OS_CT(UM): 589

## 2024-12-18 ASSESSMENT — VISUAL ACUITY
METHOD: SNELLEN - LINEAR
OS_SC: 20/40-2
OS_PH_SC: 20/25+2
OD_SC: NLP

## 2024-12-18 ASSESSMENT — EXTERNAL EXAM - RIGHT EYE: OD_EXAM: NORMAL

## 2024-12-18 ASSESSMENT — CUP TO DISC RATIO: OS_RATIO: 0.7

## 2024-12-18 ASSESSMENT — SLIT LAMP EXAM - LIDS
COMMENTS: NORMAL
COMMENTS: NORMAL

## 2024-12-18 ASSESSMENT — TONOMETRY
IOP_METHOD: GOLDMANN APPLANATION
OS_IOP_MMHG: 15
OD_IOP_MMHG: 44

## 2024-12-18 ASSESSMENT — EXTERNAL EXAM - LEFT EYE: OS_EXAM: NORMAL

## 2024-12-18 NOTE — PROGRESS NOTES
Visual Acuity (Snellen - Linear)         Right Left    Dist sc NLP 20/40-2    Dist ph sc  20/25+2          Tonometry       Tonometry (Goldmann Applanation, 10:20 AM)         Right Left    Pressure 44 08                  Assessment/Plan   Last dilation:  8/19/24    1. 5 weeks s/p combined c KDB goniotomy OS 11/12/24:  pre-op VA 20/200 and IOP = 14 mmHg.  Doing well.  IOP is acceptable compared to pre-op and with one less medication      Plan:  MRx given                F/u 4 months    2.  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  There is flow thru the Ahmed shunt OD; right eye is NLP and comfortable.  IOP remains controlled OS and acceptable given relatively healthy ON.  Pt is monocular. Latanoprost   --> no effect. IOP acceptable today, comfortable OD.  No evidence of progression      Plan:  cont combigan OU BID               cont travatan OS             2.  Cataracts OD / PCIOL OS:  not visually significant OD because he is no light perception (NLP) from neovascular glaucoma (NVG) and PDR OD.      Plan:  as above    3.  Proliferative diabetic retinopathy~E11.359, h/o Vitreous hemorrhage of left eye. s/p avastin injection with resolution of VH 8/17/17.      Plan:  as per Dr. Hodges

## 2024-12-20 ENCOUNTER — APPOINTMENT (OUTPATIENT)
Dept: OPHTHALMOLOGY | Facility: CLINIC | Age: 74
End: 2024-12-20
Payer: MEDICARE

## 2024-12-20 DIAGNOSIS — E11.3512 PROLIFERATIVE DIABETIC RETINOPATHY OF LEFT EYE WITH MACULAR EDEMA ASSOCIATED WITH TYPE 2 DIABETES MELLITUS: Primary | ICD-10-CM

## 2024-12-20 ASSESSMENT — ENCOUNTER SYMPTOMS
ALLERGIC/IMMUNOLOGIC NEGATIVE: 0
PSYCHIATRIC NEGATIVE: 0
NEUROLOGICAL NEGATIVE: 0
HEMATOLOGIC/LYMPHATIC NEGATIVE: 0
ENDOCRINE NEGATIVE: 0
CONSTITUTIONAL NEGATIVE: 0
RESPIRATORY NEGATIVE: 0
GASTROINTESTINAL NEGATIVE: 0
EYES NEGATIVE: 0
MUSCULOSKELETAL NEGATIVE: 0
CARDIOVASCULAR NEGATIVE: 0

## 2024-12-20 ASSESSMENT — PACHYMETRY
OD_CT(UM): 584
OS_CT(UM): 589

## 2024-12-20 ASSESSMENT — SLIT LAMP EXAM - LIDS
COMMENTS: NORMAL
COMMENTS: NORMAL

## 2024-12-20 ASSESSMENT — VISUAL ACUITY
OD_SC: NLP
METHOD: SNELLEN - LINEAR
OS_SC: 20/30

## 2024-12-20 ASSESSMENT — CUP TO DISC RATIO: OS_RATIO: 0.7

## 2024-12-20 ASSESSMENT — EXTERNAL EXAM - LEFT EYE: OS_EXAM: NORMAL

## 2024-12-20 ASSESSMENT — TONOMETRY
IOP_METHOD: GOLDMANN APPLANATION
OS_IOP_MMHG: 17
OD_IOP_MMHG: 48

## 2024-12-20 ASSESSMENT — EXTERNAL EXAM - RIGHT EYE: OD_EXAM: NORMAL

## 2024-12-20 NOTE — PROGRESS NOTES
Assessment/Plan   Diagnoses and all orders for this visit:  Proliferative diabetic retinopathy of left eye with macular edema associated with type 2 diabetes mellitus  -     OCT, Retina - OU - Both Eyes           RTC 6 weeks   Impression    1 E11.3512 Controlled diabetes mellitus with left eye affected by proliferative retinopathy and macular edema-Improving  2 H40.1122 Primary open angle glaucoma of left eye, moderate stage-Stable  3 H40.51X3 Neovascular glaucoma of right eye, severe stage-Stable  4 H26.9 Bilateral cataracts-Stable  5 H43.12 Vitreous hemorrhage of left eye-Stable  6 H52.203 Astigmatism, bilateral-Stable  7 H52.4 Bilateral presbyopia-Stable    DIAGNOSTIC PROCEDURE DONE    OCT DONE OD/OS            REASON FOR TEST: will help address and tailor  therapy by detecting subclinical CME SRF     Hi quality OCT  scans obtained  signal good    OCT OD - no view  OCT OS - Normal Foveal Contour, mild  Edema, IS/OS Junction Normal    additional commnents:        Discussion    1. PDR OU. NVG OD vit hem os got treated with Eylea OS. today clear vitreous   f/u 3m. has PDR DME OS  E11.3511   sugery OS avoided now focus on DME OS DME vastly improved        The right eye was being treated to keep eye from being too painful but today no pain    no treatment today OD  needs treatment for left eye    however left eye he has DME  and vision has declined from 20/25 to 20/40  Exam and OCT reveal signifant edema today    NPDR DME mild E11.3212    Patient is approved for Vabysmo    Treatment options for DME OS discussed  Treatment options for DME OS discussed, including observation, anti-VEGF injections (including Avastin, Lucentis, Vabysmo and Eylea) and laser. Recommend anti-VEGF injections. Vabysmo done OS today in a sterile manner with Betadine 5% for antisepsis.        2. 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  There is flow thru the BGI shunt OD; right eye is NLP and comfortable.   IOP is borderline controlled OS, but acceptable given rel healthy ON.  Pt is monocular. Latanoprost --> no effect       Follows with Dr. Servando grierigan OU BID            cont travatan OS QHS            f/u 3 months              3.  Cataracts OD>OS:  not significant OD and mild-moderately visually significant OS.                 Plan    RTC 6 weeks

## 2024-12-26 ENCOUNTER — APPOINTMENT (OUTPATIENT)
Dept: OPHTHALMOLOGY | Facility: CLINIC | Age: 74
End: 2024-12-26
Payer: MEDICARE

## 2025-01-07 DIAGNOSIS — I10 BENIGN ESSENTIAL HYPERTENSION: ICD-10-CM

## 2025-01-09 RX ORDER — AMLODIPINE BESYLATE 10 MG/1
10 TABLET ORAL DAILY
Qty: 90 TABLET | Refills: 0 | Status: SHIPPED | OUTPATIENT
Start: 2025-01-09

## 2025-02-10 ENCOUNTER — APPOINTMENT (OUTPATIENT)
Dept: PRIMARY CARE | Facility: CLINIC | Age: 75
End: 2025-02-10
Payer: MEDICARE

## 2025-02-10 VITALS — DIASTOLIC BLOOD PRESSURE: 76 MMHG | WEIGHT: 212 LBS | SYSTOLIC BLOOD PRESSURE: 135 MMHG | BODY MASS INDEX: 27.97 KG/M2

## 2025-02-10 DIAGNOSIS — N28.9 RENAL IMPAIRMENT: ICD-10-CM

## 2025-02-10 DIAGNOSIS — R60.0 PERIPHERAL EDEMA: ICD-10-CM

## 2025-02-10 DIAGNOSIS — E11.9 TYPE 2 DIABETES MELLITUS WITHOUT COMPLICATION, UNSPECIFIED WHETHER LONG TERM INSULIN USE (MULTI): Primary | ICD-10-CM

## 2025-02-10 PROCEDURE — 99213 OFFICE O/P EST LOW 20 MIN: CPT | Performed by: INTERNAL MEDICINE

## 2025-02-10 PROCEDURE — G2211 COMPLEX E/M VISIT ADD ON: HCPCS | Performed by: INTERNAL MEDICINE

## 2025-02-10 PROCEDURE — 3078F DIAST BP <80 MM HG: CPT | Performed by: INTERNAL MEDICINE

## 2025-02-10 PROCEDURE — 3075F SYST BP GE 130 - 139MM HG: CPT | Performed by: INTERNAL MEDICINE

## 2025-02-10 NOTE — PROGRESS NOTES
Subjective   Patient ID: David Ko is a 74 y.o. male who presents for No chief complaint on file..    HPI follow-up visit no chest pain no shortness of breath no polyuria polydipsia going to [place] in 2 weeks for short while was considering whether he had palpitations from the Jardiance has had no urinary tract infection or dysuria Home blood sugars have been about 135 average with postprandial values being about 100 DED his previous A1c and creatinine were reviewed    Review of Systems    Objective   There were no vitals taken for this visit.  Vital signs noted alert and oriented x 3 NCAT no JVD or bruit chest clear to auscultation cor regular rate and rhythm S1-S2 extremities no clubbing cyanosis there is trace peripheral edema intact distal pulses  Physical Exam    Assessment/Plan    impression diabetes mellitus with renal peripheral edema other diagnoses  Plan check A1c advised on long-term blood sugar test and whether to increase the Jardiance to 25 mg well following up with the renal physician watching salt in diet exercise good water consumption elevate legs at the end of the day wearing stocking close the current time continue with other medications review prior blood work and medications and recheck in 3 months    Check on Jardiance and other medicines (check)

## 2025-02-11 LAB
EST. AVERAGE GLUCOSE BLD GHB EST-MCNC: 171 MG/DL
EST. AVERAGE GLUCOSE BLD GHB EST-SCNC: 9.5 MMOL/L
HBA1C MFR BLD: 7.6 % OF TOTAL HGB

## 2025-02-21 ENCOUNTER — APPOINTMENT (OUTPATIENT)
Dept: OPHTHALMOLOGY | Facility: CLINIC | Age: 75
End: 2025-02-21
Payer: MEDICARE

## 2025-02-28 ENCOUNTER — APPOINTMENT (OUTPATIENT)
Dept: OPHTHALMOLOGY | Facility: CLINIC | Age: 75
End: 2025-02-28
Payer: MEDICARE

## 2025-03-21 ENCOUNTER — APPOINTMENT (OUTPATIENT)
Dept: OPHTHALMOLOGY | Facility: CLINIC | Age: 75
End: 2025-03-21
Payer: MEDICARE

## 2025-03-21 DIAGNOSIS — E11.3512 PROLIFERATIVE DIABETIC RETINOPATHY OF LEFT EYE WITH MACULAR EDEMA ASSOCIATED WITH TYPE 2 DIABETES MELLITUS: Primary | ICD-10-CM

## 2025-03-21 DIAGNOSIS — H40.1121 PRIMARY OPEN-ANGLE GLAUCOMA, LEFT EYE, MILD STAGE: ICD-10-CM

## 2025-03-21 DIAGNOSIS — H40.51X3 NEOVASCULAR GLAUCOMA OF RIGHT EYE, SEVERE STAGE: ICD-10-CM

## 2025-03-21 PROCEDURE — 67028 INJECTION EYE DRUG: CPT | Mod: LEFT SIDE | Performed by: OPHTHALMOLOGY

## 2025-03-21 PROCEDURE — 92134 CPTRZ OPH DX IMG PST SGM RTA: CPT | Performed by: OPHTHALMOLOGY

## 2025-03-21 ASSESSMENT — PACHYMETRY
OS_CT(UM): 589
OD_CT(UM): 584

## 2025-03-21 ASSESSMENT — CONF VISUAL FIELD
OD_INFERIOR_NASAL_RESTRICTION: 0
OD_SUPERIOR_TEMPORAL_RESTRICTION: 0
OS_INFERIOR_TEMPORAL_RESTRICTION: 0
OD_SUPERIOR_NASAL_RESTRICTION: 0
OS_NORMAL: 1
OS_SUPERIOR_TEMPORAL_RESTRICTION: 0
OS_SUPERIOR_NASAL_RESTRICTION: 0
OS_INFERIOR_NASAL_RESTRICTION: 0
OD_INFERIOR_TEMPORAL_RESTRICTION: 0

## 2025-03-21 ASSESSMENT — TONOMETRY
IOP_METHOD: GOLDMANN APPLANATION
OS_IOP_MMHG: 14
OD_IOP_MMHG: 44

## 2025-03-21 ASSESSMENT — EXTERNAL EXAM - RIGHT EYE: OD_EXAM: NORMAL

## 2025-03-21 ASSESSMENT — CUP TO DISC RATIO: OS_RATIO: 0.7

## 2025-03-21 ASSESSMENT — VISUAL ACUITY
METHOD: SNELLEN - LINEAR
OD_SC: NLP
OS_SC: 20/25+3

## 2025-03-21 ASSESSMENT — EXTERNAL EXAM - LEFT EYE: OS_EXAM: NORMAL

## 2025-03-21 ASSESSMENT — SLIT LAMP EXAM - LIDS
COMMENTS: NORMAL
COMMENTS: NORMAL

## 2025-03-21 NOTE — PROGRESS NOTES
Assessment/Plan   Diagnoses and all orders for this visit:  Proliferative diabetic retinopathy of left eye with macular edema associated with type 2 diabetes mellitus  -     OCT, Retina - OU - Both Eyes  -     Intravitreal Injection, Pharmacologic Agent - OS - Left Eye  Primary open-angle glaucoma, left eye, mild stage  Neovascular glaucoma of right eye, severe stage           RTC 6 weeks   Impression    1 E11.3512 Controlled diabetes mellitus with left eye affected by proliferative retinopathy and macular edema-Improving  2 H40.1122 Primary open angle glaucoma of left eye, moderate stage-Stable  3 H40.51X3 Neovascular glaucoma of right eye, severe stage-Stable  4 H26.9 Bilateral cataracts-Stable  5 H43.12 Vitreous hemorrhage of left eye-Stable  6 H52.203 Astigmatism, bilateral-Stable  7 H52.4 Bilateral presbyopia-Stable    DIAGNOSTIC PROCEDURE DONE    OCT DONE OD/OS            REASON FOR TEST: will help address and tailor  therapy by detecting subclinical CME SRF     Hi quality OCT  scans obtained  signal good    OCT OD - no view  OCT OS - Normal Foveal Contour, mild  Edema, IS/OS Junction Normal    additional commnents:        Discussion    1. PDR OU. NVG OD vit hem os got treated with Eylea OS. today clear vitreous   f/u 3m. has PDR DME OS  E11.3511   sugery OS avoided now focus on DME OS DME vastly improved        The right eye was being treated to keep eye from being too painful but today no pain    no treatment today OD  needs treatment for left eye    however left eye he has DME  and vision has declined from 20/25 to 20/40  Exam and OCT reveal signifant edema today    NPDR DME mild E11.3212    Patient is approved for Vabysmo      Treatment options for DME OS discussed, including observation, anti-VEGF injections (including Avastin, Lucentis, Vabysmo and Eylea) and laser. Recommend anti-VEGF injections. Vabysmo done OS today in a sterile manner with Betadine 5% for antisepsis.        2. Absolute Neovascular  Glaucoma OD / Primary Open-Angle Glaucoma OU:  /589.  There is flow thru the BGI shunt OD; right eye is NLP and comfortable.  IOP is borderline controlled OS, but acceptable given rel healthy ON.  Pt is monocular. Latanoprost --> no effect       Follows with Dr. Servando guillermo OU BID            cont travatan OS QHS            f/u 3 months              3.  Cataracts OD>OS:  not significant OD and mild-moderately visually significant OS.                 Plan    RTC 6 weeks

## 2025-04-08 DIAGNOSIS — I10 BENIGN ESSENTIAL HYPERTENSION: ICD-10-CM

## 2025-04-09 RX ORDER — AMLODIPINE BESYLATE 10 MG/1
10 TABLET ORAL DAILY
Qty: 90 TABLET | Refills: 0 | Status: SHIPPED | OUTPATIENT
Start: 2025-04-09

## 2025-04-16 DIAGNOSIS — Z00.00 HEALTH CARE MAINTENANCE: ICD-10-CM

## 2025-04-16 RX ORDER — PEN NEEDLE, DIABETIC 31 GX5/16"
NEEDLE, DISPOSABLE MISCELLANEOUS
Qty: 100 EACH | Refills: 2 | Status: SHIPPED | OUTPATIENT
Start: 2025-04-16

## 2025-04-21 ENCOUNTER — APPOINTMENT (OUTPATIENT)
Dept: OPHTHALMOLOGY | Facility: CLINIC | Age: 75
End: 2025-04-21
Payer: MEDICARE

## 2025-04-24 ENCOUNTER — APPOINTMENT (OUTPATIENT)
Dept: NEPHROLOGY | Facility: CLINIC | Age: 75
End: 2025-04-24
Payer: MEDICARE

## 2025-05-04 DIAGNOSIS — H40.89 OTHER SPECIFIED GLAUCOMA: ICD-10-CM

## 2025-05-04 RX ORDER — TRAVOPROST OPHTHALMIC SOLUTION 0.04 MG/ML
1 SOLUTION OPHTHALMIC NIGHTLY
Qty: 10 ML | Refills: 1 | Status: SHIPPED | OUTPATIENT
Start: 2025-05-04

## 2025-05-05 DIAGNOSIS — Z00.00 HEALTH CARE MAINTENANCE: ICD-10-CM

## 2025-05-05 DIAGNOSIS — I10 BENIGN ESSENTIAL HYPERTENSION: ICD-10-CM

## 2025-05-05 DIAGNOSIS — I10 PRIMARY HYPERTENSION: ICD-10-CM

## 2025-05-09 RX ORDER — INSULIN GLARGINE 100 [IU]/ML
INJECTION, SOLUTION SUBCUTANEOUS
Qty: 15 ML | Refills: 0 | Status: SHIPPED | OUTPATIENT
Start: 2025-05-09

## 2025-05-09 RX ORDER — CHLORTHALIDONE 25 MG/1
25 TABLET ORAL DAILY
Qty: 90 TABLET | Refills: 1 | Status: SHIPPED | OUTPATIENT
Start: 2025-05-09

## 2025-05-09 RX ORDER — AMLODIPINE BESYLATE 10 MG/1
10 TABLET ORAL DAILY
Qty: 90 TABLET | Refills: 0 | Status: SHIPPED | OUTPATIENT
Start: 2025-05-09

## 2025-05-14 ENCOUNTER — APPOINTMENT (OUTPATIENT)
Dept: OPHTHALMOLOGY | Facility: CLINIC | Age: 75
End: 2025-05-14
Payer: MEDICARE

## 2025-05-14 DIAGNOSIS — H40.1121 PRIMARY OPEN-ANGLE GLAUCOMA, LEFT EYE, MILD STAGE: Primary | ICD-10-CM

## 2025-05-14 DIAGNOSIS — H40.51X3 NEOVASCULAR GLAUCOMA OF RIGHT EYE, SEVERE STAGE: ICD-10-CM

## 2025-05-14 PROCEDURE — 99213 OFFICE O/P EST LOW 20 MIN: CPT | Performed by: OPHTHALMOLOGY

## 2025-05-14 ASSESSMENT — TONOMETRY
OS_IOP_MMHG: 11
IOP_METHOD: GOLDMANN APPLANATION
OD_IOP_MMHG: 45

## 2025-05-14 ASSESSMENT — CUP TO DISC RATIO: OS_RATIO: 0.7

## 2025-05-14 ASSESSMENT — VISUAL ACUITY
METHOD: SNELLEN - LINEAR
OS_SC: 20/20-1
OD_SC: NLP

## 2025-05-14 ASSESSMENT — PACHYMETRY
OS_CT(UM): 589
OD_CT(UM): 584

## 2025-05-14 ASSESSMENT — EXTERNAL EXAM - LEFT EYE: OS_EXAM: NORMAL

## 2025-05-14 ASSESSMENT — SLIT LAMP EXAM - LIDS
COMMENTS: NORMAL
COMMENTS: NORMAL

## 2025-05-14 ASSESSMENT — EXTERNAL EXAM - RIGHT EYE: OD_EXAM: NORMAL

## 2025-05-14 NOTE — PROGRESS NOTES
Visual Acuity (Snellen - Linear)         Right Left    Dist sc NLP 20/20-1          Tonometry       Tonometry (Goldmann Applanation, 10:14 AM)         Right Left    Pressure 45 11                  Assessment/Plan   Last dilation:  8/19/24    1.  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  There is flow thru the Ahmed shunt OD; right eye is NLP and comfortable.  IOP remains controlled OS and acceptable given relatively healthy ON.  Pt is monocular. Latanoprost   --> no effect. IOP acceptable today, comfortable OD.  s/p combined c KDB goniotomy OS 11/12/24:  pre-op VA 20/200 and IOP = 14 mmHg.  Doing well      Plan:  cont combigan OU BID                cont travatan OS                F/u 3 months (HVF, dilation, RNFL)             2.  Cataracts OD / PCIOL OS:  not visually significant OD because he is no light perception (NLP) from neovascular glaucoma (NVG) and PDR OD.      Plan:  as above    3.  Proliferative diabetic retinopathy~E11.359, h/o Vitreous hemorrhage of left eye. s/p avastin injection with resolution of VH 8/17/17. Most recept repeat IVV OS 3/21/25 for DME.      Plan:  as per Dr. Hodges

## 2025-05-21 ENCOUNTER — APPOINTMENT (OUTPATIENT)
Dept: OPHTHALMOLOGY | Facility: CLINIC | Age: 75
End: 2025-05-21
Payer: MEDICARE

## 2025-06-02 ENCOUNTER — APPOINTMENT (OUTPATIENT)
Dept: PRIMARY CARE | Facility: CLINIC | Age: 75
End: 2025-06-02
Payer: MEDICARE

## 2025-06-02 VITALS
HEART RATE: 62 BPM | DIASTOLIC BLOOD PRESSURE: 80 MMHG | OXYGEN SATURATION: 96 % | SYSTOLIC BLOOD PRESSURE: 148 MMHG | WEIGHT: 204 LBS | BODY MASS INDEX: 26.91 KG/M2

## 2025-06-02 DIAGNOSIS — Z79.4 TYPE 2 DIABETES MELLITUS WITHOUT COMPLICATION, WITH LONG-TERM CURRENT USE OF INSULIN: ICD-10-CM

## 2025-06-02 DIAGNOSIS — E78.2 MIXED HYPERLIPIDEMIA: ICD-10-CM

## 2025-06-02 DIAGNOSIS — Z00.00 HEALTH CARE MAINTENANCE: Primary | ICD-10-CM

## 2025-06-02 DIAGNOSIS — I10 PRIMARY HYPERTENSION: ICD-10-CM

## 2025-06-02 DIAGNOSIS — E11.9 TYPE 2 DIABETES MELLITUS WITHOUT COMPLICATION, WITH LONG-TERM CURRENT USE OF INSULIN: ICD-10-CM

## 2025-06-02 PROCEDURE — G0439 PPPS, SUBSEQ VISIT: HCPCS | Performed by: INTERNAL MEDICINE

## 2025-06-02 PROCEDURE — 93000 ELECTROCARDIOGRAM COMPLETE: CPT | Performed by: INTERNAL MEDICINE

## 2025-06-02 PROCEDURE — 1036F TOBACCO NON-USER: CPT | Performed by: INTERNAL MEDICINE

## 2025-06-02 PROCEDURE — 1126F AMNT PAIN NOTED NONE PRSNT: CPT | Performed by: INTERNAL MEDICINE

## 2025-06-02 PROCEDURE — 3079F DIAST BP 80-89 MM HG: CPT | Performed by: INTERNAL MEDICINE

## 2025-06-02 PROCEDURE — 1160F RVW MEDS BY RX/DR IN RCRD: CPT | Performed by: INTERNAL MEDICINE

## 2025-06-02 PROCEDURE — 99213 OFFICE O/P EST LOW 20 MIN: CPT | Performed by: INTERNAL MEDICINE

## 2025-06-02 PROCEDURE — 1159F MED LIST DOCD IN RCRD: CPT | Performed by: INTERNAL MEDICINE

## 2025-06-02 PROCEDURE — 1170F FXNL STATUS ASSESSED: CPT | Performed by: INTERNAL MEDICINE

## 2025-06-02 PROCEDURE — 3077F SYST BP >= 140 MM HG: CPT | Performed by: INTERNAL MEDICINE

## 2025-06-02 ASSESSMENT — PAIN SCALES - GENERAL: PAINLEVEL_OUTOF10: 0-NO PAIN

## 2025-06-03 LAB
ALBUMIN SERPL-MCNC: 4.5 G/DL (ref 3.6–5.1)
ALBUMIN/GLOB SERPL: 1.7 (CALC) (ref 1–2.5)
ALP SERPL-CCNC: 89 U/L (ref 35–144)
ALT SERPL-CCNC: 13 U/L (ref 9–46)
AST SERPL-CCNC: 25 U/L (ref 10–35)
BILIRUB DIRECT SERPL-MCNC: 0.1 MG/DL
BILIRUB INDIRECT SERPL-MCNC: 0.5 MG/DL (CALC) (ref 0.2–1.2)
BILIRUB SERPL-MCNC: 0.6 MG/DL (ref 0.2–1.2)
CHOLEST SERPL-MCNC: 162 MG/DL
CHOLEST/HDLC SERPL: 2.8 (CALC)
EST. AVERAGE GLUCOSE BLD GHB EST-MCNC: 174 MG/DL
EST. AVERAGE GLUCOSE BLD GHB EST-SCNC: 9.7 MMOL/L
GLOBULIN SER CALC-MCNC: 2.6 G/DL (CALC) (ref 1.9–3.7)
HBA1C MFR BLD: 7.7 %
HDLC SERPL-MCNC: 57 MG/DL
LDLC SERPL CALC-MCNC: 82 MG/DL (CALC)
NONHDLC SERPL-MCNC: 105 MG/DL (CALC)
PROT SERPL-MCNC: 7.1 G/DL (ref 6.1–8.1)
PSA SERPL-MCNC: 2.68 NG/ML
TRIGL SERPL-MCNC: 135 MG/DL

## 2025-06-15 ASSESSMENT — ENCOUNTER SYMPTOMS
DEPRESSION: 0
LOSS OF SENSATION IN FEET: 0
OCCASIONAL FEELINGS OF UNSTEADINESS: 0

## 2025-06-15 ASSESSMENT — ACTIVITIES OF DAILY LIVING (ADL)
MANAGING_FINANCES: INDEPENDENT
DOING_HOUSEWORK: INDEPENDENT
TAKING_MEDICATION: INDEPENDENT
GROCERY_SHOPPING: INDEPENDENT
DRESSING: INDEPENDENT
BATHING: INDEPENDENT

## 2025-06-20 ENCOUNTER — APPOINTMENT (OUTPATIENT)
Dept: OPHTHALMOLOGY | Facility: CLINIC | Age: 75
End: 2025-06-20
Payer: COMMERCIAL

## 2025-06-20 DIAGNOSIS — E11.3412 SEVERE NONPROLIFERATIVE DIABETIC RETINOPATHY OF LEFT EYE, WITH MACULAR EDEMA, ASSOCIATED WITH TYPE 2 DIABETES MELLITUS: Primary | ICD-10-CM

## 2025-06-20 PROCEDURE — 92134 CPTRZ OPH DX IMG PST SGM RTA: CPT | Mod: LEFT SIDE | Performed by: OPHTHALMOLOGY

## 2025-06-20 PROCEDURE — 99213 OFFICE O/P EST LOW 20 MIN: CPT | Performed by: OPHTHALMOLOGY

## 2025-06-20 ASSESSMENT — VISUAL ACUITY
OD_CC: NLP
OS_CC: 20/20-2
CORRECTION_TYPE: GLASSES
METHOD: SNELLEN - LINEAR

## 2025-06-20 ASSESSMENT — PACHYMETRY
OD_CT(UM): 584
OS_CT(UM): 589

## 2025-06-20 ASSESSMENT — CUP TO DISC RATIO: OS_RATIO: 0.7

## 2025-06-20 ASSESSMENT — EXTERNAL EXAM - LEFT EYE: OS_EXAM: NORMAL

## 2025-06-20 ASSESSMENT — TONOMETRY
OS_IOP_MMHG: 12
OD_IOP_MMHG: 46
IOP_METHOD: GOLDMANN APPLANATION

## 2025-06-20 ASSESSMENT — EXTERNAL EXAM - RIGHT EYE: OD_EXAM: NORMAL

## 2025-06-20 ASSESSMENT — SLIT LAMP EXAM - LIDS
COMMENTS: NORMAL
COMMENTS: NORMAL

## 2025-07-10 ENCOUNTER — TELEPHONE (OUTPATIENT)
Dept: PRIMARY CARE | Facility: CLINIC | Age: 75
End: 2025-07-10

## 2025-07-10 DIAGNOSIS — Z00.00 HEALTH CARE MAINTENANCE: ICD-10-CM

## 2025-07-10 RX ORDER — PEN NEEDLE, DIABETIC 31 GX5/16"
NEEDLE, DISPOSABLE MISCELLANEOUS
Qty: 100 EACH | Refills: 2 | Status: SHIPPED | OUTPATIENT
Start: 2025-07-10

## 2025-07-15 ENCOUNTER — TELEPHONE (OUTPATIENT)
Dept: PRIMARY CARE | Facility: CLINIC | Age: 75
End: 2025-07-15

## 2025-08-07 DIAGNOSIS — Z00.00 HEALTH CARE MAINTENANCE: ICD-10-CM

## 2025-08-08 RX ORDER — INSULIN GLARGINE 100 [IU]/ML
INJECTION, SOLUTION SUBCUTANEOUS
Qty: 15 ML | Refills: 0 | Status: SHIPPED | OUTPATIENT
Start: 2025-08-08

## 2025-08-15 ENCOUNTER — APPOINTMENT (OUTPATIENT)
Dept: OPHTHALMOLOGY | Facility: CLINIC | Age: 75
End: 2025-08-15
Payer: COMMERCIAL

## 2025-08-18 ENCOUNTER — APPOINTMENT (OUTPATIENT)
Dept: OPHTHALMOLOGY | Facility: CLINIC | Age: 75
End: 2025-08-18
Payer: COMMERCIAL

## 2025-08-22 ENCOUNTER — APPOINTMENT (OUTPATIENT)
Dept: OPHTHALMOLOGY | Facility: CLINIC | Age: 75
End: 2025-08-22
Payer: COMMERCIAL

## 2025-08-22 DIAGNOSIS — H34.8120 HEMISPHERIC RETINAL VEIN OCCLUSION WITH MACULAR EDEMA OF LEFT EYE: Primary | ICD-10-CM

## 2025-08-22 PROCEDURE — 92134 CPTRZ OPH DX IMG PST SGM RTA: CPT | Performed by: OPHTHALMOLOGY

## 2025-08-22 PROCEDURE — 67028 INJECTION EYE DRUG: CPT | Mod: LEFT SIDE | Performed by: OPHTHALMOLOGY

## 2025-08-22 RX ORDER — SODIUM BICARBONATE 650 MG/1
650 TABLET ORAL 3 TIMES DAILY
COMMUNITY
Start: 2025-05-15

## 2025-08-22 RX ORDER — TIMOLOL MALEATE 5 MG/ML
SOLUTION/ DROPS OPHTHALMIC
COMMUNITY

## 2025-08-22 ASSESSMENT — VISUAL ACUITY
CORRECTION_TYPE: GLASSES
OS_CC: 20/25
OS_CC+: -2
OD_CC: NLP
METHOD: SNELLEN - LINEAR

## 2025-08-22 ASSESSMENT — PACHYMETRY
OD_CT(UM): 584
OS_CT(UM): 589

## 2025-08-22 ASSESSMENT — TONOMETRY
IOP_METHOD: GOLDMANN APPLANATION
OS_IOP_MMHG: 21
OD_IOP_MMHG: 55

## 2025-09-15 ENCOUNTER — APPOINTMENT (OUTPATIENT)
Dept: OPHTHALMOLOGY | Facility: CLINIC | Age: 75
End: 2025-09-15
Payer: COMMERCIAL

## 2025-09-26 ENCOUNTER — APPOINTMENT (OUTPATIENT)
Dept: OPHTHALMOLOGY | Facility: CLINIC | Age: 75
End: 2025-09-26
Payer: COMMERCIAL

## 2025-10-02 ENCOUNTER — APPOINTMENT (OUTPATIENT)
Dept: PRIMARY CARE | Facility: CLINIC | Age: 75
End: 2025-10-02
Payer: COMMERCIAL

## 2026-06-03 ENCOUNTER — APPOINTMENT (OUTPATIENT)
Dept: PRIMARY CARE | Facility: CLINIC | Age: 76
End: 2026-06-03
Payer: COMMERCIAL

## (undated) DEVICE — BLADE, KAHOOK DUAL

## (undated) DEVICE — KNIFE, SLIT, ANGLED UP, 2.65 MM

## (undated) DEVICE — NEEDLE, HYPODERMIC, REGULAR WALL, REGULAR BEVEL, 30 G X 0.5 IN

## (undated) DEVICE — SUTURE, VICRYL, 7-0, 18 IN, TG1608, DA, VIOLET

## (undated) DEVICE — DRAPE, SURGICAL, STERI DRAPE, INCISE, W/POUCH, MED, LF

## (undated) DEVICE — CANNULA, VISTEC, ANTERIOR CHAMBER, RYCROFT, ANGLED, 30 G X 7/8 IN

## (undated) DEVICE — SPEAR, EYE, SURGICAL, WECK-CEL, CELLULOSE

## (undated) DEVICE — Device

## (undated) DEVICE — CORD, FORCEP, BIPOLAR, DISP

## (undated) DEVICE — TOWEL, SURGICAL, NEURO, O/R, 16 X 26, BLUE, STERILE

## (undated) DEVICE — CANNULA, HYDRODISSECTION, MICRO, 25 G X 8 MM

## (undated) DEVICE — SUTURE LIGATING LOOP W/SURGIGUT, 2-0, 21INCH

## (undated) DEVICE — ERASER, HEMOSTATIC, WET-FIELD, BIPOLAR, 18 G

## (undated) DEVICE — HANDPIECE,  IRRIGATION/ASPIRATION, 45DEG, 2.2MM-2.8MM, BLUE

## (undated) DEVICE — SYRINGE, 1 CC, LUER LOCK

## (undated) DEVICE — KNIFE, OPHTHALMIC, CRESCENT, ANGLED, BEVEL UP, SATIN

## (undated) DEVICE — DRAPE, UTILITY, INSTRUMENT PANEL, 46CM X 56CM (18X22"

## (undated) DEVICE — NEEDLE, RETROBULBAR, 23G X 8MM

## (undated) DEVICE — CANNULA, HYDRODISSECTION, FLATTENED, IRRIGATING

## (undated) DEVICE — GLOVE, SURGICAL, PROTEXIS PI , 6.5, PF, LF